# Patient Record
Sex: FEMALE | Race: ASIAN | NOT HISPANIC OR LATINO | Employment: UNEMPLOYED | URBAN - METROPOLITAN AREA
[De-identification: names, ages, dates, MRNs, and addresses within clinical notes are randomized per-mention and may not be internally consistent; named-entity substitution may affect disease eponyms.]

---

## 2021-09-30 ENCOUNTER — HISTORICAL (OUTPATIENT)
Dept: RADIOLOGY | Facility: HOSPITAL | Age: 51
End: 2021-09-30

## 2022-04-09 ENCOUNTER — HISTORICAL (OUTPATIENT)
Dept: ADMINISTRATIVE | Facility: HOSPITAL | Age: 52
End: 2022-04-09

## 2022-04-25 VITALS
WEIGHT: 98 LBS | OXYGEN SATURATION: 100 % | BODY MASS INDEX: 18.5 KG/M2 | SYSTOLIC BLOOD PRESSURE: 119 MMHG | HEIGHT: 61 IN | DIASTOLIC BLOOD PRESSURE: 76 MMHG

## 2023-02-14 LAB
HCV AB SERPL QL IA: NEGATIVE
HIV 1+2 AB+HIV1 P24 AG SERPL QL IA: NORMAL

## 2023-02-17 LAB — PAP RECOMMENDATION EXT: NORMAL

## 2024-01-08 ENCOUNTER — OFFICE VISIT (OUTPATIENT)
Dept: FAMILY MEDICINE | Facility: CLINIC | Age: 54
End: 2024-01-08
Payer: COMMERCIAL

## 2024-01-08 ENCOUNTER — DOCUMENTATION ONLY (OUTPATIENT)
Dept: FAMILY MEDICINE | Facility: CLINIC | Age: 54
End: 2024-01-08

## 2024-01-08 ENCOUNTER — TELEPHONE (OUTPATIENT)
Dept: FAMILY MEDICINE | Facility: CLINIC | Age: 54
End: 2024-01-08

## 2024-01-08 VITALS
RESPIRATION RATE: 17 BRPM | HEART RATE: 67 BPM | SYSTOLIC BLOOD PRESSURE: 96 MMHG | WEIGHT: 113.81 LBS | DIASTOLIC BLOOD PRESSURE: 64 MMHG | HEIGHT: 61 IN | OXYGEN SATURATION: 99 % | BODY MASS INDEX: 21.49 KG/M2 | TEMPERATURE: 98 F

## 2024-01-08 DIAGNOSIS — N94.6 DYSMENORRHEA: ICD-10-CM

## 2024-01-08 DIAGNOSIS — Z12.31 BREAST CANCER SCREENING BY MAMMOGRAM: ICD-10-CM

## 2024-01-08 DIAGNOSIS — Z12.11 ENCOUNTER FOR SCREENING FOR MALIGNANT NEOPLASM OF COLON: ICD-10-CM

## 2024-01-08 DIAGNOSIS — N92.0 MENORRHAGIA WITH REGULAR CYCLE: ICD-10-CM

## 2024-01-08 DIAGNOSIS — Z13.6 ENCOUNTER FOR LIPID SCREENING FOR CARDIOVASCULAR DISEASE: ICD-10-CM

## 2024-01-08 DIAGNOSIS — Z76.89 ENCOUNTER TO ESTABLISH CARE: Primary | ICD-10-CM

## 2024-01-08 DIAGNOSIS — R30.0 DYSURIA: ICD-10-CM

## 2024-01-08 DIAGNOSIS — Z00.00 WELL ADULT HEALTH CHECK: ICD-10-CM

## 2024-01-08 DIAGNOSIS — Z13.220 ENCOUNTER FOR LIPID SCREENING FOR CARDIOVASCULAR DISEASE: ICD-10-CM

## 2024-01-08 DIAGNOSIS — Z13.29 SCREENING FOR THYROID DISORDER: ICD-10-CM

## 2024-01-08 DIAGNOSIS — Z13.1 ENCOUNTER FOR SCREENING FOR DIABETES MELLITUS: ICD-10-CM

## 2024-01-08 LAB
APPEARANCE UR: CLEAR
BACTERIA #/AREA URNS AUTO: NORMAL /HPF
BILIRUB UR QL STRIP.AUTO: NEGATIVE
COLOR UR AUTO: COLORLESS
GLUCOSE UR QL STRIP.AUTO: NORMAL
KETONES UR QL STRIP.AUTO: NEGATIVE
LEUKOCYTE ESTERASE UR QL STRIP.AUTO: NEGATIVE
NITRITE UR QL STRIP.AUTO: NEGATIVE
PH UR STRIP.AUTO: 6.5 [PH]
PROT UR QL STRIP.AUTO: NEGATIVE
RBC #/AREA URNS AUTO: NORMAL /HPF
RBC UR QL AUTO: NEGATIVE
SP GR UR STRIP.AUTO: 1 (ref 1–1.03)
SQUAMOUS #/AREA URNS LPF: NORMAL /HPF
UROBILINOGEN UR STRIP-ACNC: NORMAL
WBC #/AREA URNS AUTO: NORMAL /HPF

## 2024-01-08 PROCEDURE — 3078F DIAST BP <80 MM HG: CPT | Mod: CPTII,,, | Performed by: STUDENT IN AN ORGANIZED HEALTH CARE EDUCATION/TRAINING PROGRAM

## 2024-01-08 PROCEDURE — 1159F MED LIST DOCD IN RCRD: CPT | Mod: CPTII,,, | Performed by: STUDENT IN AN ORGANIZED HEALTH CARE EDUCATION/TRAINING PROGRAM

## 2024-01-08 PROCEDURE — 99204 OFFICE O/P NEW MOD 45 MIN: CPT | Mod: ,,, | Performed by: STUDENT IN AN ORGANIZED HEALTH CARE EDUCATION/TRAINING PROGRAM

## 2024-01-08 PROCEDURE — 3074F SYST BP LT 130 MM HG: CPT | Mod: CPTII,,, | Performed by: STUDENT IN AN ORGANIZED HEALTH CARE EDUCATION/TRAINING PROGRAM

## 2024-01-08 PROCEDURE — 3008F BODY MASS INDEX DOCD: CPT | Mod: CPTII,,, | Performed by: STUDENT IN AN ORGANIZED HEALTH CARE EDUCATION/TRAINING PROGRAM

## 2024-01-08 RX ORDER — NITROFURANTOIN 25; 75 MG/1; MG/1
100 CAPSULE ORAL 2 TIMES DAILY
Qty: 14 CAPSULE | Refills: 0 | Status: SHIPPED | OUTPATIENT
Start: 2024-01-08 | End: 2024-01-15

## 2024-01-08 NOTE — PROGRESS NOTES
Patient ID: [id number]     Chief Complaint: Menorrhagia, Abdominal Cramping, Vaginal Bleeding, and Painful urination    HPI:      Disclaimer:  This note is prepared using voice recognition software and as such is likely to have errors despite attempts at proofreading. Please contact me for questions.     Dante Bro is a 53 y.o. female here today for the following    Acute Issues-  Increased uterine bleeding   Patient reports that she has been having heavy blood follow during the periods.  Denies of any clots.  Recently had a Pap smear which was unremarkable.  Denies any family history of uterine/cervical cancer.  She is currently sexually not active.  Also has been having dysuria.  Reports of having several UTIs in past.  Denies of any suprapubic pain, back pain or fevers      Chronic Issues-    No past medical history on file.     History reviewed. No pertinent surgical history.    Review of patient's allergies indicates:   Allergen Reactions    Ibuprofen Swelling       No current outpatient medications    Social History     Socioeconomic History    Marital status:    Tobacco Use    Smoking status: Never     Passive exposure: Never    Smokeless tobacco: Never   Substance and Sexual Activity    Alcohol use: Never    Drug use: Never    Sexual activity: Not Currently     Partners: Male     Birth control/protection: None     Social Determinants of Health     Financial Resource Strain: Patient Declined (1/8/2024)    Overall Financial Resource Strain (CARDIA)     Difficulty of Paying Living Expenses: Patient declined   Food Insecurity: Patient Declined (1/8/2024)    Hunger Vital Sign     Worried About Running Out of Food in the Last Year: Patient declined     Ran Out of Food in the Last Year: Patient declined   Transportation Needs: Patient Declined (1/8/2024)    PRAPARE - Transportation     Lack of Transportation (Medical): Patient declined     Lack of Transportation (Non-Medical): Patient declined   Physical  Activity: Unknown (1/8/2024)    Exercise Vital Sign     Days of Exercise per Week: 3 days     Minutes of Exercise per Session: Patient declined   Stress: No Stress Concern Present (1/8/2024)    [employer] - Occupational Stress Questionnaire     Feeling of Stress : Not at all   Social Connections: Unknown (1/8/2024)    Social Connection and Isolation Panel [NHANES]     Frequency of Communication with Friends and Family: More than three times a week     Frequency of Social Gatherings with Friends and Family: More than three times a week     Active Member of Clubs or Organizations: Yes     Attends Club or Organization Meetings: Patient declined     Marital Status:    Housing Stability: Unknown (1/8/2024)    Housing Stability Vital Sign     Unable to Pay for Housing in the Last Year: Patient declined     Unstable Housing in the Last Year: No        Family History   Problem Relation Age of Onset    Cancer Father     Asthma Brother         Patient Care Team:  Jeanette Arzola MD as PCP - General (Family Medicine)     Subjective:     ROS    See HPI for details    Constitutional: Denies Change in appetite. Denies Chills. Denies Fever. Denies Night sweats.  Respiratory: Denies Cough. Denies Shortness of breath. Denies Shortness of breath with exertion. Denies Wheezing.  Cardiovascular: DeniesChest pain at rest. Denies Chest pain with exertion. Denies Irregular heartbeat. Denies Palpitations. Denies Edema.  Gastrointestinal: Denies Abdominal pain. DeniesDiarrhea. Denies Nausea. Denies Vomiting. Denies Hematemesis or Hematochezia.  Genitourinary: Denies Dysuria. Denies Urinary frequency. Denies Urinary urgency. Denies Blood in urine.  Endocrine: Denies Cold intolerance. Denies Excessive thirst. Denies Heat intolerance. Denies Weight loss. Denies Weight gain.  Musculoskeletal: Denies Painful joints. Denies Weakness.  Integumentary: Denies Rash. Denies Itching. Denies Dry  skin.  Neurologic: Denies Dizziness. Denies Fainting. Denies Headache.  Psychiatric: Denies Depression. Denies Anxiety. Denies Suicidal/Homicidal ideations.    All Other ROS: Negative except as stated in HPI.  Objective:     Visit Vitals  LMP 12/20/2023 (Exact Date)       Physical Exam    General: Alert and oriented, No acute distress.  Respiratory: Clear to auscultation bilaterally; No wheezes, rales or rhonchi,Non-labored respirations, Symmetrical chest wall expansion.  Cardiovascular: Regular rate and rhythm, S1/S2 normal, No murmurs, rubs or gallops.  Gastrointestinal: Soft, Non-tender, Non-distended, Normal bowel sounds, No palpable organomegaly.  Musculoskeletal: Normal range of motion.  Extremities: No clubbing, cyanosis or edema  Neurologic: No focal deficits  Psychiatric: Normal interaction, Coherent speech, Euthymic mood, Appropriate affect   Assessment:       ICD-10-CM ICD-9-CM   1. Encounter to establish care  Z76.89 V65.8   2. Menorrhagia with regular cycle  N92.0 626.2   3. Dysmenorrhea  N94.6 625.3   4. Dysuria  R30.0 788.1   5. Encounter for screening for diabetes mellitus  Z13.1 V77.1   6. Encounter for lipid screening for cardiovascular disease  Z13.220 V77.91    Z13.6 V81.2   7. Breast cancer screening by mammogram  Z12.31 V76.12   8. Encounter for screening for malignant neoplasm of colon  Z12.11 V76.51   9. Screening for thyroid disorder  Z13.29 V77.0   10. Well adult health check  Z00.00 V70.0        Plan:     1. Encounter to establish care  Discuss about the blood work/screening test/immunizations   Recommend to start dash diet along with 35 minutes of brisk walking    2. Menorrhagia with regular cycle  3. Dysmenorrhea  -     US Transvaginal Non OB; Future; Expected date: 01/08/2024  -     Cancel: POCT Urine Pregnancy- UPT declined  Can take over-the-counter ibuprofen for pain    4. Dysuria  -     Urinalysis  -     Urine culture; Future; Expected date: 01/08/2024  -     nitrofurantoin,  macrocrystal-monohydrate, (MACROBID) 100 MG capsule; Take 1 capsule (100 mg total) by mouth 2 (two) times daily. for 7 days  Dispense: 14 capsule; Refill: 0    5. Encounter for screening for diabetes mellitus  -     Hemoglobin A1C; Future; Expected date: 01/08/2024    6. Encounter for lipid screening for cardiovascular disease  -     Lipid Panel; Future; Expected date: 01/08/2024    7. Breast cancer screening by mammogram  -     Mammo Digital Screening Bilat w/ Abel; Future; Expected date: 01/08/2024    8. Encounter for screening for malignant neoplasm of colon  -     Cologuard Screening (Multitarget Stool DNA); Future; Expected date: 01/08/2024    9. Screening for thyroid disorder  -     TSH; Future; Expected date: 01/08/2024    10. Well adult health check  -     CBC Auto Differential; Future; Expected date: 01/08/2024  -     Comprehensive Metabolic Panel; Future; Expected date: 01/08/2024  -     Vitamin D; Future; Expected date: 01/08/2024                  Follow up in about 4 weeks (around 2/5/2024).   In addition to their scheduled follow up, the patient has also been instructed to follow up on as needed basis.

## 2024-01-08 NOTE — TELEPHONE ENCOUNTER
----- Message from Jeanette Arzola MD sent at 1/8/2024  9:52 AM CST -----  Please get Pap Smear-   From  [facility] on [address]   [phone]

## 2024-01-10 ENCOUNTER — LAB VISIT (OUTPATIENT)
Dept: LAB | Facility: HOSPITAL | Age: 54
End: 2024-01-10
Attending: STUDENT IN AN ORGANIZED HEALTH CARE EDUCATION/TRAINING PROGRAM
Payer: COMMERCIAL

## 2024-01-10 DIAGNOSIS — Z00.00 WELL ADULT HEALTH CHECK: ICD-10-CM

## 2024-01-10 DIAGNOSIS — Z13.29 SCREENING FOR THYROID DISORDER: ICD-10-CM

## 2024-01-10 DIAGNOSIS — Z13.6 ENCOUNTER FOR LIPID SCREENING FOR CARDIOVASCULAR DISEASE: ICD-10-CM

## 2024-01-10 DIAGNOSIS — Z13.220 ENCOUNTER FOR LIPID SCREENING FOR CARDIOVASCULAR DISEASE: ICD-10-CM

## 2024-01-10 DIAGNOSIS — Z13.1 ENCOUNTER FOR SCREENING FOR DIABETES MELLITUS: ICD-10-CM

## 2024-01-10 LAB
ALBUMIN SERPL-MCNC: 3.8 G/DL (ref 3.5–5)
ALBUMIN/GLOB SERPL: 1 RATIO (ref 1.1–2)
ALP SERPL-CCNC: 70 UNIT/L (ref 40–150)
ALT SERPL-CCNC: 17 UNIT/L (ref 0–55)
AST SERPL-CCNC: 14 UNIT/L (ref 5–34)
BASOPHILS # BLD AUTO: 0.12 X10(3)/MCL
BASOPHILS NFR BLD AUTO: 2 %
BILIRUB SERPL-MCNC: 0.6 MG/DL
BUN SERPL-MCNC: 9.8 MG/DL (ref 9.8–20.1)
CALCIUM SERPL-MCNC: 9.6 MG/DL (ref 8.4–10.2)
CHLORIDE SERPL-SCNC: 104 MMOL/L (ref 98–107)
CHOLEST SERPL-MCNC: 233 MG/DL
CHOLEST/HDLC SERPL: 4 {RATIO} (ref 0–5)
CO2 SERPL-SCNC: 27 MMOL/L (ref 22–29)
CREAT SERPL-MCNC: 0.73 MG/DL (ref 0.55–1.02)
DEPRECATED CALCIDIOL+CALCIFEROL SERPL-MC: 34.1 NG/ML (ref 30–80)
EOSINOPHIL # BLD AUTO: 0.28 X10(3)/MCL (ref 0–0.9)
EOSINOPHIL NFR BLD AUTO: 4.7 %
ERYTHROCYTE [DISTWIDTH] IN BLOOD BY AUTOMATED COUNT: 12.9 % (ref 11.5–17)
EST. AVERAGE GLUCOSE BLD GHB EST-MCNC: 116.9 MG/DL
GFR SERPLBLD CREATININE-BSD FMLA CKD-EPI: >60 MLS/MIN/1.73/M2
GLOBULIN SER-MCNC: 3.7 GM/DL (ref 2.4–3.5)
GLUCOSE SERPL-MCNC: 99 MG/DL (ref 74–100)
HBA1C MFR BLD: 5.7 %
HCT VFR BLD AUTO: 41.5 % (ref 37–47)
HDLC SERPL-MCNC: 55 MG/DL (ref 35–60)
HGB BLD-MCNC: 13.2 G/DL (ref 12–16)
IMM GRANULOCYTES # BLD AUTO: 0.02 X10(3)/MCL (ref 0–0.04)
IMM GRANULOCYTES NFR BLD AUTO: 0.3 %
LDLC SERPL CALC-MCNC: 164 MG/DL (ref 50–140)
LYMPHOCYTES # BLD AUTO: 1.4 X10(3)/MCL (ref 0.6–4.6)
LYMPHOCYTES NFR BLD AUTO: 23.3 %
MCH RBC QN AUTO: 28.4 PG (ref 27–31)
MCHC RBC AUTO-ENTMCNC: 31.8 G/DL (ref 33–36)
MCV RBC AUTO: 89.4 FL (ref 80–94)
MONOCYTES # BLD AUTO: 0.5 X10(3)/MCL (ref 0.1–1.3)
MONOCYTES NFR BLD AUTO: 8.3 %
NEUTROPHILS # BLD AUTO: 3.69 X10(3)/MCL (ref 2.1–9.2)
NEUTROPHILS NFR BLD AUTO: 61.4 %
NRBC BLD AUTO-RTO: 0 %
PLATELET # BLD AUTO: 395 X10(3)/MCL (ref 130–400)
PMV BLD AUTO: 8.8 FL (ref 7.4–10.4)
POTASSIUM SERPL-SCNC: 4 MMOL/L (ref 3.5–5.1)
PROT SERPL-MCNC: 7.5 GM/DL (ref 6.4–8.3)
RBC # BLD AUTO: 4.64 X10(6)/MCL (ref 4.2–5.4)
SODIUM SERPL-SCNC: 138 MMOL/L (ref 136–145)
TRIGL SERPL-MCNC: 70 MG/DL (ref 37–140)
TSH SERPL-ACNC: 1.25 UIU/ML (ref 0.35–4.94)
VLDLC SERPL CALC-MCNC: 14 MG/DL
WBC # SPEC AUTO: 6.01 X10(3)/MCL (ref 4.5–11.5)

## 2024-01-10 PROCEDURE — 83036 HEMOGLOBIN GLYCOSYLATED A1C: CPT

## 2024-01-10 PROCEDURE — 84443 ASSAY THYROID STIM HORMONE: CPT

## 2024-01-10 PROCEDURE — 80053 COMPREHEN METABOLIC PANEL: CPT

## 2024-01-10 PROCEDURE — 85025 COMPLETE CBC W/AUTO DIFF WBC: CPT

## 2024-01-10 PROCEDURE — 36415 COLL VENOUS BLD VENIPUNCTURE: CPT

## 2024-01-10 PROCEDURE — 80061 LIPID PANEL: CPT

## 2024-01-10 PROCEDURE — 82306 VITAMIN D 25 HYDROXY: CPT

## 2024-01-11 ENCOUNTER — TELEPHONE (OUTPATIENT)
Dept: FAMILY MEDICINE | Facility: CLINIC | Age: 54
End: 2024-01-11
Payer: COMMERCIAL

## 2024-01-11 ENCOUNTER — PATIENT MESSAGE (OUTPATIENT)
Dept: FAMILY MEDICINE | Facility: CLINIC | Age: 54
End: 2024-01-11
Payer: COMMERCIAL

## 2024-01-11 ENCOUNTER — HOSPITAL ENCOUNTER (OUTPATIENT)
Dept: RADIOLOGY | Facility: HOSPITAL | Age: 54
Discharge: HOME | End: 2024-01-11
Attending: STUDENT IN AN ORGANIZED HEALTH CARE EDUCATION/TRAINING PROGRAM
Payer: COMMERCIAL

## 2024-01-11 DIAGNOSIS — D21.9 LEIOMYOMA: Primary | ICD-10-CM

## 2024-01-11 DIAGNOSIS — N94.6 DYSMENORRHEA: ICD-10-CM

## 2024-01-11 DIAGNOSIS — N92.0 MENORRHAGIA WITH REGULAR CYCLE: ICD-10-CM

## 2024-01-11 PROCEDURE — 76830 TRANSVAGINAL US NON-OB: CPT | Mod: TC

## 2024-01-11 NOTE — TELEPHONE ENCOUNTER
----- Message from Jeanette Arzola MD sent at 1/10/2024  8:12 PM CST -----  Please call lab and let them know that we need urine culture as the patient asymptomatic  ----- Message -----  From: Lab, Background User  Sent: 1/9/2024   6:23 AM CST  To: Jeanette Arzola MD

## 2024-01-11 NOTE — PROGRESS NOTES
Please inform the patient that transvaginal ultrasound demonstrated leiomyomas (benign soft tissue tumor of uterus).  She will need to be seen by a OB/GYN.  Please find out if the patient has Ob/gyn and fax the results to the patient's Ob/gyn.  If not then please find out patient's preferred OBGYN and fax the referral to her office    Thanks,    Dr. Arzola

## 2024-01-11 NOTE — PROGRESS NOTES
Please inform patient of lab results.    Prediabetic and increased cholesterol.  We will discuss in detail during the next clinic visit.  As of now recommend the patient to start 35 minutes of brisk walking along with Mediterranean diet.  Avoid greasy/fatty diet.    Other labwork within acceptable ranges.    Thanks,    Dr. Arzola

## 2024-01-12 NOTE — TELEPHONE ENCOUNTER
----- Message from Jeanette Arzola MD sent at 1/11/2024  4:54 PM CST -----  Please inform the patient that transvaginal ultrasound demonstrated leiomyomas (benign soft tissue tumor of uterus).  She will need to be seen by a OB/GYN.  Please find out if the patient has Ob/gyn and fax the results to the patient's Ob/gyn.  If not then please find out patient's preferred OBGYN and fax the referral to her office    Thanks,    Dr. Arzola

## 2024-01-16 ENCOUNTER — TELEPHONE (OUTPATIENT)
Dept: FAMILY MEDICINE | Facility: CLINIC | Age: 54
End: 2024-01-16
Payer: COMMERCIAL

## 2024-01-16 DIAGNOSIS — D21.9 LEIOMYOMA: Primary | ICD-10-CM

## 2024-01-16 DIAGNOSIS — N30.00 ACUTE CYSTITIS WITHOUT HEMATURIA: Primary | ICD-10-CM

## 2024-01-16 RX ORDER — AMOXICILLIN AND CLAVULANATE POTASSIUM 875; 125 MG/1; MG/1
1 TABLET, FILM COATED ORAL EVERY 12 HOURS
Qty: 14 TABLET | Refills: 0 | Status: SHIPPED | OUTPATIENT
Start: 2024-01-16 | End: 2024-01-23

## 2024-01-16 NOTE — TELEPHONE ENCOUNTER
----- Message from Jeanette Arzola MD sent at 1/16/2024 10:59 AM CST -----  Please inform patient of lab results.    1. E coli less than 10,000 CFU.  Since the patient symptomatic will send antibiotics to her pharmacy.  Discontinue nitrofurantoin.  Recommend to start antibiotics as prescribed increase hydration.  Please give ER precautions.    2.     3. Other labwork within acceptable ranges.    Thanks,    Dr. Arzola

## 2024-02-15 ENCOUNTER — OFFICE VISIT (OUTPATIENT)
Dept: FAMILY MEDICINE | Facility: CLINIC | Age: 54
End: 2024-02-15
Payer: COMMERCIAL

## 2024-02-15 VITALS
OXYGEN SATURATION: 99 % | HEART RATE: 72 BPM | DIASTOLIC BLOOD PRESSURE: 68 MMHG | TEMPERATURE: 98 F | RESPIRATION RATE: 17 BRPM | BODY MASS INDEX: 19.77 KG/M2 | WEIGHT: 104.69 LBS | HEIGHT: 61 IN | SYSTOLIC BLOOD PRESSURE: 106 MMHG

## 2024-02-15 DIAGNOSIS — N39.0 RECURRENT UTI: ICD-10-CM

## 2024-02-15 DIAGNOSIS — Z00.00 WELL ADULT HEALTH CHECK: ICD-10-CM

## 2024-02-15 DIAGNOSIS — R73.03 PREDIABETES: ICD-10-CM

## 2024-02-15 DIAGNOSIS — E78.2 MIXED HYPERLIPIDEMIA: ICD-10-CM

## 2024-02-15 DIAGNOSIS — Z00.00 WELLNESS EXAMINATION: ICD-10-CM

## 2024-02-15 LAB
APPEARANCE UR: CLEAR
BACTERIA #/AREA URNS AUTO: ABNORMAL /HPF
BILIRUB UR QL STRIP.AUTO: NEGATIVE
COLOR UR AUTO: YELLOW
GLUCOSE UR QL STRIP.AUTO: NORMAL
KETONES UR QL STRIP.AUTO: NEGATIVE
LEUKOCYTE ESTERASE UR QL STRIP.AUTO: NEGATIVE
MUCOUS THREADS URNS QL MICRO: ABNORMAL /LPF
NITRITE UR QL STRIP.AUTO: NEGATIVE
PH UR STRIP.AUTO: 5.5 [PH]
PROT UR QL STRIP.AUTO: NEGATIVE
RBC #/AREA URNS AUTO: ABNORMAL /HPF
RBC UR QL AUTO: NEGATIVE
SP GR UR STRIP.AUTO: 1.02 (ref 1–1.03)
SQUAMOUS #/AREA URNS LPF: ABNORMAL /HPF
UROBILINOGEN UR STRIP-ACNC: NORMAL
WBC #/AREA URNS AUTO: ABNORMAL /HPF

## 2024-02-15 PROCEDURE — 3044F HG A1C LEVEL LT 7.0%: CPT | Mod: CPTII,,, | Performed by: STUDENT IN AN ORGANIZED HEALTH CARE EDUCATION/TRAINING PROGRAM

## 2024-02-15 PROCEDURE — 1159F MED LIST DOCD IN RCRD: CPT | Mod: CPTII,,, | Performed by: STUDENT IN AN ORGANIZED HEALTH CARE EDUCATION/TRAINING PROGRAM

## 2024-02-15 PROCEDURE — 3008F BODY MASS INDEX DOCD: CPT | Mod: CPTII,,, | Performed by: STUDENT IN AN ORGANIZED HEALTH CARE EDUCATION/TRAINING PROGRAM

## 2024-02-15 PROCEDURE — 1160F RVW MEDS BY RX/DR IN RCRD: CPT | Mod: CPTII,,, | Performed by: STUDENT IN AN ORGANIZED HEALTH CARE EDUCATION/TRAINING PROGRAM

## 2024-02-15 PROCEDURE — 3078F DIAST BP <80 MM HG: CPT | Mod: CPTII,,, | Performed by: STUDENT IN AN ORGANIZED HEALTH CARE EDUCATION/TRAINING PROGRAM

## 2024-02-15 PROCEDURE — 99396 PREV VISIT EST AGE 40-64: CPT | Mod: ,,, | Performed by: STUDENT IN AN ORGANIZED HEALTH CARE EDUCATION/TRAINING PROGRAM

## 2024-02-15 PROCEDURE — 3074F SYST BP LT 130 MM HG: CPT | Mod: CPTII,,, | Performed by: STUDENT IN AN ORGANIZED HEALTH CARE EDUCATION/TRAINING PROGRAM

## 2024-02-15 RX ORDER — AMOXICILLIN AND CLAVULANATE POTASSIUM 875; 125 MG/1; MG/1
1 TABLET, FILM COATED ORAL EVERY 12 HOURS
Qty: 14 TABLET | Refills: 0 | Status: SHIPPED | OUTPATIENT
Start: 2024-02-15 | End: 2024-02-22

## 2024-02-15 RX ORDER — NITROFURANTOIN 25; 75 MG/1; MG/1
100 CAPSULE ORAL 2 TIMES DAILY
Qty: 14 CAPSULE | Refills: 0 | Status: SHIPPED | OUTPATIENT
Start: 2024-02-15 | End: 2024-02-15

## 2024-02-15 RX ORDER — PHENAZOPYRIDINE HYDROCHLORIDE 200 MG/1
200 TABLET, FILM COATED ORAL 3 TIMES DAILY PRN
Qty: 6 TABLET | Refills: 0 | Status: SHIPPED | OUTPATIENT
Start: 2024-02-15 | End: 2024-02-17

## 2024-02-15 NOTE — PROGRESS NOTES
Patient ID: [id number]     Chief Complaint:  Annual Wellness    HPI:      Disclaimer:  This note is prepared using voice recognition software and as such is likely to have errors despite attempts at proofreading. Please contact me for questions.    Dante Bro is a 53 y.o. female here today for an annual wellness visit.    Patient has following issues to discuss today    Acute Issues-  Recurrent UTI-  Patient has a having recurrent UTI with bladder pain.  Denies of any fever, suprapubic pain, back pain.  It was associated with dribbling of urine.    Review of patient's allergies indicates:   Allergen Reactions    Ibuprofen Swelling       No current outpatient medications    Social History     Socioeconomic History    Marital status:    Tobacco Use    Smoking status: Never     Passive exposure: Never    Smokeless tobacco: Never   Substance and Sexual Activity    Alcohol use: Never    Drug use: Never    Sexual activity: Not Currently     Partners: Male     Birth control/protection: None     Social Determinants of Health     Financial Resource Strain: Patient Declined (1/8/2024)    Overall Financial Resource Strain (CARDIA)     Difficulty of Paying Living Expenses: Patient declined   Food Insecurity: Patient Declined (1/8/2024)    Hunger Vital Sign     Worried About Running Out of Food in the Last Year: Patient declined     Ran Out of Food in the Last Year: Patient declined   Transportation Needs: Patient Declined (1/8/2024)    PRAPARE - Transportation     Lack of Transportation (Medical): Patient declined     Lack of Transportation (Non-Medical): Patient declined   Physical Activity: Unknown (1/8/2024)    Exercise Vital Sign     Days of Exercise per Week: 3 days     Minutes of Exercise per Session: Patient declined   Stress: No Stress Concern Present (1/8/2024)    [employer] [employer] - Occupational Stress Questionnaire     Feeling of Stress : Not at all   Social Connections: Unknown  (1/8/2024)    Social Connection and Isolation Panel [NHANES]     Frequency of Communication with Friends and Family: More than three times a week     Frequency of Social Gatherings with Friends and Family: More than three times a week     Active Member of Clubs or Organizations: Yes     Attends Club or Organization Meetings: Patient declined     Marital Status:    Housing Stability: Unknown (1/8/2024)    Housing Stability Vital Sign     Unable to Pay for Housing in the Last Year: Patient declined     Unstable Housing in the Last Year: No        Family History   Problem Relation Age of Onset    Cancer Father     Asthma Brother         Patient Care Team:  Jeanette Arzola MD as PCP - General (Family Medicine)       Subjective:     ROS    See HPI for details    Constitutional: Denies Change in appetite. Denies Chills. Denies Fever. Denies Night sweats.  Respiratory: Denies Cough. Denies Shortness of breath. Denies Shortness of breath with exertion. Denies Wheezing.  Cardiovascular: DeniesChest pain at rest. Denies Chest pain with exertion. Denies Irregular heartbeat. Denies Palpitations. Denies Edema.  Gastrointestinal: Denies Abdominal pain. DeniesDiarrhea. Denies Nausea. Denies Vomiting. Denies Hematemesis or Hematochezia.  Genitourinary: Denies Dysuria. Denies Urinary frequency. Denies Urinary urgency. Denies Blood in urine.  Endocrine: Denies Cold intolerance. Denies Excessive thirst. Denies Heat intolerance. Denies Weight loss. Denies Weight gain.  Musculoskeletal: Denies Painful joints. Denies Weakness.  Integumentary: Denies Rash. Denies Itching. Denies Dry skin.  Neurologic: Denies Dizziness. Denies Fainting. Denies Headache.  Psychiatric: Denies Depression. Denies Anxiety. Denies Suicidal/Homicidal ideations.    All Other ROS: Negative except as stated in HPI.       Objective:     Visit Vitals  /68 (BP Location: Right arm, Patient Position: Sitting, BP Method: Large (Manual))   Pulse 72   Temp  "98.1 °F (36.7 °C) (Oral)   Resp 17   Ht 5' 1" (1.549 m)   Wt 47.5 kg (104 lb 11.2 oz)   SpO2 99%   BMI 19.78 kg/m²       Physical Exam    General: Alert and oriented, No acute distress.  Neck/Thyroid: Supple, Non-tender  Respiratory: Clear to auscultation bilaterally; No wheezes  Cardiovascular: Regular rate and rhythm  Gastrointestinal: Soft, Non-tender,No palpable organomegaly.  Musculoskeletal: Normal range of motion.  Neurologic: No focal deficits  Psychiatric: Normal interaction, Coherent speech, Euthymic mood, Appropriate affect       Assessment:       ICD-10-CM ICD-9-CM   1. Wellness examination  Z00.00 V70.0   2. Mixed hyperlipidemia  E78.2 272.2   3. Prediabetes  R73.03 790.29   4. Recurrent UTI  N39.0 599.0        Plan:         Health Maintenance Topics with due status: Not Due       Topic Last Completion Date    Cervical Cancer Screening 02/17/2023    Lipid Panel 01/10/2024          Vaccinations -   Immunization History   Administered Date(s) Administered    COVID-19, MRNA, LN-S, PF (Pfizer) (Purple Cap) 09/10/2021, 10/01/2021    Influenza - Quadrivalent - PF *Preferred* (6 months and older) 11/29/2022, 11/16/2023        1. Wellness examination  Cervical Cancer Screening -Pap Smear by [facility]  Breast Cancer Screening - Mammogram ordered  Colon Cancer Screening - Cologuard sent  Osteoporosis Screening -  DEXA   Eye Exam - Recommend annually.  Dental Exam - Recommend biannual exams.     Diet discussed (healthy food choices, reduce portions and overall calorie intake)  Exercise 30-45 minutes 5x per week  Avoid excessive alcohol and tobacco if taking  Immunizations dicussed  Monthly breast exam recommended  Preventative exams discussed.      2. Mixed hyperlipidemia  The 10-year ASCVD risk score (Angela SMILEY, et al., 2019) is: 0.9%    Values used to calculate the score:      Age: 53 years      Sex: Female      Is Non- : No      Diabetic: No      Tobacco smoker: No      Systolic Blood " Pressure: 96 mmHg      Is BP treated: No      HDL Cholesterol: 55 mg/dL      Total Cholesterol: 233 mg/dL   Recommend to start 35 minutes of brisk walking along with Mediterranean diet    3. Prediabetes  -     Hemoglobin A1C; Future; Expected date: 02/15/2024  Okay we will not start metformin therapy as of today   Recommend lifestyle modification if does not get better in 4-6 months then we will start metformin therapy    4. Recurrent UTI  -     CT Abdomen Pelvis  Without Contrast; Future; Expected date: 02/15/2024  -     Urinalysis  -     Urine culture; Future; Expected date: 02/15/2024  -     Discontinue: nitrofurantoin, macrocrystal-monohydrate, (MACROBID) 100 MG capsule; Take 1 capsule (100 mg total) by mouth 2 (two) times daily. for 7 days  Dispense: 14 capsule; Refill: 0  -     phenazopyridine (PYRIDIUM) 200 MG tablet; Take 1 tablet (200 mg total) by mouth 3 (three) times daily as needed for Pain.  Dispense: 6 tablet; Refill: 0  -     POCT Urine Pregnancy  -     amoxicillin-clavulanate 875-125mg (AUGMENTIN) 875-125 mg per tablet; Take 1 tablet by mouth every 12 (twelve) hours. for 7 days  Dispense: 14 tablet; Refill: 0                The patient's Health Maintenance was reviewed and the following appears to be due at this time:   Health Maintenance Due   Topic Date Due    TETANUS VACCINE  Never done    Mammogram  Never done    Colorectal Cancer Screening  Never done    Shingles Vaccine (1 of 2) Never done    COVID-19 Vaccine (3 - 2023-24 season) 09/01/2023         Follow up in about 4 months (around 6/15/2024) for Prediabetes.  One year annual wellness follow-up.   In addition to their scheduled follow up, the patient has also been instructed to follow up on as needed basis.

## 2024-02-27 ENCOUNTER — PATIENT MESSAGE (OUTPATIENT)
Dept: FAMILY MEDICINE | Facility: CLINIC | Age: 54
End: 2024-02-27
Payer: COMMERCIAL

## 2024-02-29 ENCOUNTER — PATIENT MESSAGE (OUTPATIENT)
Dept: FAMILY MEDICINE | Facility: CLINIC | Age: 54
End: 2024-02-29
Payer: COMMERCIAL

## 2024-02-29 LAB — NONINV COLON CA DNA+OCC BLD SCRN STL QL: NEGATIVE

## 2024-03-07 ENCOUNTER — HOSPITAL ENCOUNTER (OUTPATIENT)
Dept: RADIOLOGY | Facility: HOSPITAL | Age: 54
Discharge: HOME | End: 2024-03-07
Attending: STUDENT IN AN ORGANIZED HEALTH CARE EDUCATION/TRAINING PROGRAM
Payer: COMMERCIAL

## 2024-03-07 DIAGNOSIS — N39.0 RECURRENT UTI: ICD-10-CM

## 2024-03-07 PROCEDURE — 74176 CT ABD & PELVIS W/O CONTRAST: CPT | Mod: TC

## 2024-03-11 DIAGNOSIS — R05.3 CHRONIC COUGH: ICD-10-CM

## 2024-03-11 DIAGNOSIS — J98.11 ATELECTASIS: Primary | ICD-10-CM

## 2024-03-11 NOTE — PROGRESS NOTES
Please inform patient of CT results.    1. CT abdomen and pelvis demonstrates patchy infiltrative process versus atelectasis of the lung which is not clear    2. CT lung ordered      Thanks,    Dr. Arzola

## 2024-03-12 ENCOUNTER — TELEPHONE (OUTPATIENT)
Dept: FAMILY MEDICINE | Facility: CLINIC | Age: 54
End: 2024-03-12
Payer: COMMERCIAL

## 2024-03-12 NOTE — TELEPHONE ENCOUNTER
----- Message from Jeanette Arzola MD sent at 3/11/2024  5:54 PM CDT -----  Please inform patient of CT results.    1. CT abdomen and pelvis demonstrates patchy infiltrative process versus atelectasis of the lung which is not clear    2. CT lung ordered      Thanks,    Dr. Arzola

## 2024-04-24 ENCOUNTER — OFFICE VISIT (OUTPATIENT)
Dept: FAMILY MEDICINE | Facility: CLINIC | Age: 54
End: 2024-04-24
Payer: COMMERCIAL

## 2024-04-24 VITALS
DIASTOLIC BLOOD PRESSURE: 71 MMHG | HEIGHT: 61 IN | HEART RATE: 68 BPM | WEIGHT: 105 LBS | BODY MASS INDEX: 19.83 KG/M2 | SYSTOLIC BLOOD PRESSURE: 106 MMHG | TEMPERATURE: 98 F | OXYGEN SATURATION: 98 %

## 2024-04-24 DIAGNOSIS — R30.0 DYSURIA: Primary | ICD-10-CM

## 2024-04-24 DIAGNOSIS — N30.10 INTERSTITIAL CYSTITIS: ICD-10-CM

## 2024-04-24 LAB
APPEARANCE UR: ABNORMAL
BACTERIA #/AREA URNS AUTO: ABNORMAL /HPF
BILIRUB UR QL STRIP.AUTO: NEGATIVE
COLOR UR AUTO: ABNORMAL
GLUCOSE UR QL STRIP.AUTO: NORMAL
KETONES UR QL STRIP.AUTO: NEGATIVE
LEUKOCYTE ESTERASE UR QL STRIP.AUTO: 500
MUCOUS THREADS URNS QL MICRO: ABNORMAL /LPF
NITRITE UR QL STRIP.AUTO: NEGATIVE
PH UR STRIP.AUTO: 7 [PH]
PROT UR QL STRIP.AUTO: NEGATIVE
RBC #/AREA URNS AUTO: ABNORMAL /HPF
RBC UR QL AUTO: ABNORMAL
SP GR UR STRIP.AUTO: 1.01 (ref 1–1.03)
SQUAMOUS #/AREA URNS LPF: ABNORMAL /HPF
UROBILINOGEN UR STRIP-ACNC: NORMAL
WBC #/AREA URNS AUTO: >100 /HPF

## 2024-04-24 PROCEDURE — 87086 URINE CULTURE/COLONY COUNT: CPT | Performed by: STUDENT IN AN ORGANIZED HEALTH CARE EDUCATION/TRAINING PROGRAM

## 2024-04-24 PROCEDURE — 1160F RVW MEDS BY RX/DR IN RCRD: CPT | Mod: CPTII,,, | Performed by: STUDENT IN AN ORGANIZED HEALTH CARE EDUCATION/TRAINING PROGRAM

## 2024-04-24 PROCEDURE — 1159F MED LIST DOCD IN RCRD: CPT | Mod: CPTII,,, | Performed by: STUDENT IN AN ORGANIZED HEALTH CARE EDUCATION/TRAINING PROGRAM

## 2024-04-24 PROCEDURE — 99214 OFFICE O/P EST MOD 30 MIN: CPT | Mod: ,,, | Performed by: STUDENT IN AN ORGANIZED HEALTH CARE EDUCATION/TRAINING PROGRAM

## 2024-04-24 PROCEDURE — 81001 URINALYSIS AUTO W/SCOPE: CPT | Performed by: STUDENT IN AN ORGANIZED HEALTH CARE EDUCATION/TRAINING PROGRAM

## 2024-04-24 PROCEDURE — 3044F HG A1C LEVEL LT 7.0%: CPT | Mod: CPTII,,, | Performed by: STUDENT IN AN ORGANIZED HEALTH CARE EDUCATION/TRAINING PROGRAM

## 2024-04-24 PROCEDURE — 3078F DIAST BP <80 MM HG: CPT | Mod: CPTII,,, | Performed by: STUDENT IN AN ORGANIZED HEALTH CARE EDUCATION/TRAINING PROGRAM

## 2024-04-24 PROCEDURE — 3074F SYST BP LT 130 MM HG: CPT | Mod: CPTII,,, | Performed by: STUDENT IN AN ORGANIZED HEALTH CARE EDUCATION/TRAINING PROGRAM

## 2024-04-24 PROCEDURE — 3008F BODY MASS INDEX DOCD: CPT | Mod: CPTII,,, | Performed by: STUDENT IN AN ORGANIZED HEALTH CARE EDUCATION/TRAINING PROGRAM

## 2024-04-24 NOTE — PROGRESS NOTES
Patient ID: [id number]     Chief Complaint: Pain (Patient reports that she is having pain after urination, frequent pain lasting on and off for 6 months.)    HPI:      Disclaimer:  This note is prepared using voice recognition software and as such is likely to have errors despite attempts at proofreading. Please contact me for questions.     Dante Bro is a 54 y.o. female here today for the following        Acute Issues-  Dysuria   Patient reports that she has been having pain after urination and gets better after urination.  Has had multiple dysuria symptoms in past and negative for UTI.  CT abdomen and pelvis was unremarkable.  Denies of any suprapubic pain, back pain, fever, nausea, vomiting, bloody urine    Chronic Issues-    No past medical history on file.     No past surgical history on file.    Review of patient's allergies indicates:   Allergen Reactions    Ibuprofen Swelling       No current outpatient medications    Social History     Socioeconomic History    Marital status:    Tobacco Use    Smoking status: Never     Passive exposure: Never    Smokeless tobacco: Never   Substance and Sexual Activity    Alcohol use: Never    Drug use: Never    Sexual activity: Not Currently     Partners: Male     Birth control/protection: None     Social Determinants of Health     Financial Resource Strain: Low Risk  (4/24/2024)    Overall Financial Resource Strain (CARDIA)     Difficulty of Paying Living Expenses: Not hard at all   Food Insecurity: No Food Insecurity (4/24/2024)    Hunger Vital Sign     Worried About Running Out of Food in the Last Year: Never true     Ran Out of Food in the Last Year: Never true   Transportation Needs: No Transportation Needs (4/24/2024)    PRAPARE - Transportation     Lack of Transportation (Medical): No     Lack of Transportation (Non-Medical): No   Physical Activity: Sufficiently Active (4/24/2024)    Exercise Vital Sign     Days of Exercise per Week: 7 days     Minutes of  Exercise per Session: 60 min   Stress: No Stress Concern Present (4/24/2024)    [employer] - Occupational Stress Questionnaire     Feeling of Stress : Not at all   Social Connections: Moderately Integrated (4/24/2024)    Social Connection and Isolation Panel [NHANES]     Frequency of Communication with Friends and Family: More than three times a week     Frequency of Social Gatherings with Friends and Family: More than three times a week     Attends [employer] Services: More than 4 times per year     Active Member of Clubs or Organizations: Yes     Attends Club or Organization Meetings: More than 4 times per year     Marital Status:    Housing Stability: Low Risk  (4/24/2024)    Housing Stability Vital Sign     Unable to Pay for Housing in the Last Year: No     Number of Times Moved in the Last Year: 0     Homeless in the Last Year: No        Family History   Problem Relation Name Age of Onset    Cancer Father      Asthma Brother          Patient Care Team:  Jeanette Arzola MD as PCP - General (Family Medicine)     Subjective:     ROS    See HPI for details    Constitutional: Denies Change in appetite. Denies Chills. Denies Fever. Denies Night sweats.  Respiratory: Denies Cough. Denies Shortness of breath. Denies Shortness of breath with exertion. Denies Wheezing.  Cardiovascular: DeniesChest pain at rest. Denies Chest pain with exertion. Denies Irregular heartbeat. Denies Palpitations. Denies Edema.  Gastrointestinal: Denies Abdominal pain. DeniesDiarrhea. Denies Nausea. Denies Vomiting. Denies Hematemesis or Hematochezia.  Genitourinary: Denies Dysuria. Denies Urinary frequency. Denies Urinary urgency. Denies Blood in urine.  Endocrine: Denies Cold intolerance. Denies Excessive thirst. Denies Heat intolerance. Denies Weight loss. Denies Weight gain.  Musculoskeletal: Denies Painful joints. Denies Weakness.  Integumentary: Denies Rash. Denies Itching. Denies Dry  "skin.  Neurologic: Denies Dizziness. Denies Fainting. Denies Headache.  Psychiatric: Denies Depression. Denies Anxiety. Denies Suicidal/Homicidal ideations.    All Other ROS: Negative except as stated in HPI.  Objective:     Visit Vitals  /71 (BP Location: Left arm, Patient Position: Sitting, BP Method: Small (Automatic))   Pulse 68   Temp 98.2 °F (36.8 °C) (Oral)   Ht 5' 1" (1.549 m)   Wt 47.6 kg (105 lb)   SpO2 98%   BMI 19.84 kg/m²       Physical Exam    General: Alert and oriented, No acute distress.  Respiratory: Clear to auscultation bilaterally; No wheezes, rales or rhonchi,Non-labored respirations, Symmetrical chest wall expansion.  Cardiovascular: Regular rate and rhythm, S1/S2 normal, No murmurs, rubs or gallops.    Assessment:       ICD-10-CM ICD-9-CM   1. Dysuria  R30.0 788.1   2. Interstitial cystitis  N30.10 595.1        Plan:     1. Dysuria  We will hold off on nitrofurantoin as of now since multiple times patient has been negative for UTI  -     Ambulatory referral/consult to Urology; Future; Expected date: 05/01/2024  -     Urine culture; Future; Expected date: 04/24/2024  -     Urinalysis    2. Interstitial cystitis  Recommend to start amitriptyline but patient would like to hold off until being seen by Urology  Urology referral sent for cystoscopy  -     Ambulatory referral/consult to Urology; Future; Expected date: 05/01/2024                  Follow up in about 1 year (around 4/24/2025) for Annual Wellness.   In addition to their scheduled follow up, the patient has also been instructed to follow up on as needed basis.       "

## 2024-04-25 ENCOUNTER — PATIENT MESSAGE (OUTPATIENT)
Dept: FAMILY MEDICINE | Facility: CLINIC | Age: 54
End: 2024-04-25
Payer: COMMERCIAL

## 2024-04-25 DIAGNOSIS — G47.00 INSOMNIA, UNSPECIFIED TYPE: Primary | ICD-10-CM

## 2024-04-25 RX ORDER — DOXEPIN 6 MG/1
6 TABLET, FILM COATED ORAL NIGHTLY
Qty: 30 TABLET | Refills: 0 | Status: SHIPPED | OUTPATIENT
Start: 2024-04-25

## 2024-04-25 NOTE — PROGRESS NOTES
Please inform patient of lab results.    1. No growth as of now for 24 hours      Thanks,    Dr. Arzola

## 2024-04-26 ENCOUNTER — TELEPHONE (OUTPATIENT)
Dept: FAMILY MEDICINE | Facility: CLINIC | Age: 54
End: 2024-04-26
Payer: COMMERCIAL

## 2024-04-26 NOTE — PROGRESS NOTES
Please inform patient of lab results.    1. Gram-negative rods less than 50,000 colony-forming unit but continue antibiotics as the patient is symptomatic.  Waiting on sensitivity.  Increase hydration        Thanks,    Dr. Arzola

## 2024-04-26 NOTE — TELEPHONE ENCOUNTER
----- Message from Viky Nielson sent at 4/26/2024 11:16 AM CDT -----  Regarding: urology referral  .Type:  Needs Medical Advice    Who Called: pt    Symptoms (please be specific):      How long has patient had these symptoms:      Pharmacy name and phone #:      Would the patient rather a call back or a response via [facility]?     Best Call Back Number: [phone]    Additional Information: pt called to state that she was contacted by Dr. Alexander Olson's office for an appt, but it will not be until July; pt states that she cannot wait that long with her symptoms; she'd like it if a sooner appt could be found for her; please advise. Thanks.

## 2024-04-26 NOTE — TELEPHONE ENCOUNTER
Called pt and voiced I found an sooner appointment with [facility]. I have faxed over all the information.

## 2024-04-27 LAB — BACTERIA UR CULT: ABNORMAL

## 2024-04-29 ENCOUNTER — TELEPHONE (OUTPATIENT)
Dept: FAMILY MEDICINE | Facility: CLINIC | Age: 54
End: 2024-04-29
Payer: COMMERCIAL

## 2024-04-29 DIAGNOSIS — G47.00 INSOMNIA, UNSPECIFIED TYPE: ICD-10-CM

## 2024-04-29 DIAGNOSIS — N30.10 INTERSTITIAL CYSTITIS: Primary | ICD-10-CM

## 2024-04-29 DIAGNOSIS — N30.00 ACUTE CYSTITIS WITHOUT HEMATURIA: Primary | ICD-10-CM

## 2024-04-29 RX ORDER — ZOLPIDEM TARTRATE 5 MG/1
5 TABLET ORAL NIGHTLY PRN
Qty: 30 TABLET | Refills: 0 | Status: SHIPPED | OUTPATIENT
Start: 2024-04-29 | End: 2024-06-16

## 2024-04-29 RX ORDER — AMOXICILLIN AND CLAVULANATE POTASSIUM 875; 125 MG/1; MG/1
1 TABLET, FILM COATED ORAL EVERY 12 HOURS
Qty: 14 TABLET | Refills: 0 | Status: SHIPPED | OUTPATIENT
Start: 2024-04-29 | End: 2024-05-06

## 2024-04-29 NOTE — TELEPHONE ENCOUNTER
----- Message from Jaquelin Ken sent at 4/29/2024 11:19 AM CDT -----  .Type:  Patient Returning Call    Who Called:Yusef from [facility] pharmacy   Who Left Message for Patient:Yusef  Does the patient know what this is regarding?:doxepin (SILENOR) 6 mg Tab  Would the patient rather a call back or a response via [facility]?   Best Call Back Number:[phone]  Additional Information: Please call back about doxepin (SILENOR) 6 mg Tab

## 2024-04-30 NOTE — PROGRESS NOTES
Please inform patient of lab results.    1. Urine culture demonstrates Klebsiella pneumoniae, sensitive to Augmentin.  Please continue and finish the antibiotics as prescribed    Thanks,    Dr. Arzola

## 2024-06-06 ENCOUNTER — HOSPITAL ENCOUNTER (OUTPATIENT)
Dept: RADIOLOGY | Facility: HOSPITAL | Age: 54
Discharge: HOME | End: 2024-06-06
Attending: STUDENT IN AN ORGANIZED HEALTH CARE EDUCATION/TRAINING PROGRAM
Payer: COMMERCIAL

## 2024-06-06 DIAGNOSIS — Z12.31 BREAST CANCER SCREENING BY MAMMOGRAM: ICD-10-CM

## 2024-06-06 PROCEDURE — 77063 BREAST TOMOSYNTHESIS BI: CPT | Mod: 26,,, | Performed by: STUDENT IN AN ORGANIZED HEALTH CARE EDUCATION/TRAINING PROGRAM

## 2024-06-06 PROCEDURE — 77067 SCR MAMMO BI INCL CAD: CPT | Mod: 26,,, | Performed by: STUDENT IN AN ORGANIZED HEALTH CARE EDUCATION/TRAINING PROGRAM

## 2024-06-06 PROCEDURE — 77063 BREAST TOMOSYNTHESIS BI: CPT | Mod: TC

## 2024-06-11 DIAGNOSIS — R92.8 ABNORMAL MAMMOGRAM: Primary | ICD-10-CM

## 2024-06-11 NOTE — PROGRESS NOTES
Please inform patient of abnormal mammogram    RECOMMENDATIONS:   Bilateral diagnostic mammography with tomosynthesis (to include both spot compression and spot magnification views of the right breast and spot magnification views of the left breast), followed by targeted bilateral breast ultrasound if needed.    [facility] staff will call the patient to schedule.         Thanks,    Dr. Arzola

## 2024-06-12 ENCOUNTER — PATIENT MESSAGE (OUTPATIENT)
Dept: FAMILY MEDICINE | Facility: CLINIC | Age: 54
End: 2024-06-12
Payer: COMMERCIAL

## 2024-06-16 ENCOUNTER — TELEPHONE (OUTPATIENT)
Dept: URGENT CARE | Facility: CLINIC | Age: 54
End: 2024-06-16

## 2024-06-16 ENCOUNTER — OFFICE VISIT (OUTPATIENT)
Dept: URGENT CARE | Facility: CLINIC | Age: 54
End: 2024-06-16
Payer: COMMERCIAL

## 2024-06-16 VITALS
TEMPERATURE: 98 F | SYSTOLIC BLOOD PRESSURE: 112 MMHG | BODY MASS INDEX: 20.39 KG/M2 | RESPIRATION RATE: 18 BRPM | HEIGHT: 61 IN | OXYGEN SATURATION: 100 % | HEART RATE: 63 BPM | WEIGHT: 108 LBS | DIASTOLIC BLOOD PRESSURE: 74 MMHG

## 2024-06-16 DIAGNOSIS — R35.0 URINARY FREQUENCY: ICD-10-CM

## 2024-06-16 DIAGNOSIS — N39.0 URINARY TRACT INFECTION WITHOUT HEMATURIA, SITE UNSPECIFIED: Primary | ICD-10-CM

## 2024-06-16 LAB
BILIRUB UR QL STRIP: NEGATIVE
GLUCOSE UR QL STRIP: NEGATIVE
KETONES UR QL STRIP: NEGATIVE
LEUKOCYTE ESTERASE UR QL STRIP: POSITIVE
PH, POC UA: 6.5
POC BLOOD, URINE: NEGATIVE
POC NITRATES, URINE: NEGATIVE
PROT UR QL STRIP: NEGATIVE
SP GR UR STRIP: 1 (ref 1–1.03)
UROBILINOGEN UR STRIP-ACNC: NEGATIVE (ref 0.1–1.1)

## 2024-06-16 PROCEDURE — 81003 URINALYSIS AUTO W/O SCOPE: CPT | Mod: QW,,,

## 2024-06-16 PROCEDURE — 99213 OFFICE O/P EST LOW 20 MIN: CPT | Mod: ,,,

## 2024-06-16 PROCEDURE — 87086 URINE CULTURE/COLONY COUNT: CPT

## 2024-06-16 RX ORDER — AMOXICILLIN AND CLAVULANATE POTASSIUM 875; 125 MG/1; MG/1
1 TABLET, FILM COATED ORAL EVERY 12 HOURS
Qty: 14 TABLET | Refills: 0 | Status: SHIPPED | OUTPATIENT
Start: 2024-06-16 | End: 2024-06-23

## 2024-06-16 RX ORDER — NITROFURANTOIN 25; 75 MG/1; MG/1
100 CAPSULE ORAL 2 TIMES DAILY
Qty: 14 CAPSULE | Refills: 0 | Status: SHIPPED | OUTPATIENT
Start: 2024-06-16 | End: 2024-06-16

## 2024-06-16 NOTE — PATIENT INSTRUCTIONS
UTI: Drink plenty of fluids.  Take antibiotics until completely gone. Take with food to avoid upset stomach.     Pyridium as needed for dysuria/frequency.  This will turn your urine orange, this is nothing to be worried about and a normal side effect of medicine.    We will call you in 3-5 days with your urine culture results.    Go to ER for any high fever, vomiting, severe abdominal pain, or other concerns.

## 2024-06-16 NOTE — PROGRESS NOTES
"Subjective:      Patient ID: Dante Bro is a 54 y.o. female.    Vitals:  height is 5' 1" (1.549 m) and weight is 49 kg (108 lb). Her oral temperature is 97.8 °F (36.6 °C). Her blood pressure is 112/74 and her pulse is 63. Her respiration is 18 and oxygen saturation is 100%.     Chief Complaint: Urinary Frequency     Patient is a 54 y.o. female who presents to urgent care with complaints of urinary frequency, blood in urine, dysuria and pressure in pelvic area x 2 days. Has taken tylenol OTC and cystex, has not had any today.  She denies any known fever, vomiting, severe abdominal pain at this time.     Reviewed patient's urine culture from 2 months ago and we will treat based on sensitivity results.  It appears she has frequent urinary problems.       Genitourinary:  Positive for dysuria, frequency, urgency and hematuria.      Objective:     Physical Exam   Constitutional: She is oriented to person, place, and time. She appears well-developed. She is cooperative.  Non-toxic appearance. She does not appear ill. No distress.   HENT:   Mouth/Throat: Uvula is midline.   Eyes: Lids are normal.   Neck: Trachea normal and phonation normal. No edema present. No erythema present.   Cardiovascular: Normal rate, regular rhythm, normal heart sounds and normal pulses.   Pulmonary/Chest: Effort normal and breath sounds normal. She has no decreased breath sounds.   Abdominal: Normal appearance and bowel sounds are normal. She exhibits no distension. Soft. There is no abdominal tenderness. There is no left CVA tenderness and no right CVA tenderness.   Musculoskeletal: Normal range of motion.         General: No deformity. Normal range of motion.   Neurological: She is alert and oriented to person, place, and time. She exhibits normal muscle tone. Coordination normal.   Skin: Skin is warm, intact, not diaphoretic and no rash.   Psychiatric: Her speech is normal and behavior is normal. Judgment and thought content normal.   Nursing " "note and vitals reviewed.      Assessment:     1. Urinary tract infection without hematuria, site unspecified    2. Urinary frequency           Previous History      Review of patient's allergies indicates:   Allergen Reactions    Ibuprofen Swelling       Past Medical History:   Diagnosis Date    Cystitis      Current Outpatient Medications   Medication Instructions    amoxicillin-clavulanate 875-125mg (AUGMENTIN) 875-125 mg per tablet 1 tablet, Oral, Every 12 hours    doxepin (SILENOR) 6 mg, Oral, Nightly    zolpidem (AMBIEN) 5 mg, Oral, Nightly PRN     Past Surgical History:   Procedure Laterality Date    no past surgery       Family History   Problem Relation Name Age of Onset    Cancer Father      Asthma Brother         Social History     Tobacco Use    Smoking status: Never     Passive exposure: Never    Smokeless tobacco: Never   Substance Use Topics    Alcohol use: Never    Drug use: Never        Physical Exam      Vital Signs Reviewed   /74   Pulse 63   Temp 97.8 °F (36.6 °C) (Oral)   Resp 18   Ht 5' 1" (1.549 m)   Wt 49 kg (108 lb)   SpO2 100%   BMI 20.41 kg/m²        Procedures    Procedures     Labs     Results for orders placed or performed in visit on 06/16/24   POCT Urinalysis, Dipstick, Automated, W/O Scope   Result Value Ref Range    POC Blood, Urine Negative Negative    POC Bilirubin, Urine Negative Negative    POC Urobilinogen, Urine Negative 0.1 - 1.1    POC Ketones, Urine Negative Negative    POC Protein, Urine Negative Negative    POC Nitrates, Urine Negative Negative    POC Glucose, Urine Negative Negative    pH, UA 6.5     POC Specific Gravity, Urine 1.000 (A) 1.003 - 1.029    POC Leukocytes, Urine Positive (A) Negative      Plan:     Patient called and stated that Macrobid it was not helped her in the past and she usually has to take Augmentin.  Sent Augmentin and canceled Macrobid order.     Urinary tract infection without hematuria, site unspecified  -     Discontinue: " nitrofurantoin, macrocrystal-monohydrate, (MACROBID) 100 MG capsule; Take 1 capsule (100 mg total) by mouth 2 (two) times daily. for 7 days  Dispense: 14 capsule; Refill: 0  -     amoxicillin-clavulanate 875-125mg (AUGMENTIN) 875-125 mg per tablet; Take 1 tablet by mouth every 12 (twelve) hours. for 7 days  Dispense: 14 tablet; Refill: 0    Urinary frequency  -     POCT Urinalysis, Dipstick, Automated, W/O Scope  -     Urine culture      UTI: Drink plenty of fluids.  Take antibiotics until completely gone. Take with food to avoid upset stomach.     Pyridium as needed for dysuria/frequency.  This will turn your urine orange, this is nothing to be worried about and a normal side effect of medicine.    We will call you in 3-5 days with your urine culture results.    Go to ER for any high fever, vomiting, severe abdominal pain, or other concerns.

## 2024-06-16 NOTE — TELEPHONE ENCOUNTER
Patient would like to know if she should take Rx of Macrobid or if an alternative medication could be sent to pharmacy. She stated that she did not realize that Macrobid was generic for nitrofurantoin when discussing in the room,and that this medication has not helped her in th past. Please advise.

## 2024-06-18 LAB — BACTERIA UR CULT: ABNORMAL

## 2024-06-20 RX ORDER — SULFAMETHOXAZOLE AND TRIMETHOPRIM 800; 160 MG/1; MG/1
1 TABLET ORAL 2 TIMES DAILY
Qty: 14 TABLET | Refills: 0 | Status: SHIPPED | OUTPATIENT
Start: 2024-06-20 | End: 2024-06-27

## 2024-08-19 ENCOUNTER — HOSPITAL ENCOUNTER (OUTPATIENT)
Dept: RADIOLOGY | Facility: HOSPITAL | Age: 54
Discharge: HOME | End: 2024-08-19
Attending: STUDENT IN AN ORGANIZED HEALTH CARE EDUCATION/TRAINING PROGRAM
Payer: COMMERCIAL

## 2024-08-19 VITALS — BODY MASS INDEX: 20.39 KG/M2 | HEIGHT: 61 IN | WEIGHT: 108 LBS

## 2024-08-19 DIAGNOSIS — R92.8 ABNORMAL MAMMOGRAM: ICD-10-CM

## 2024-08-19 PROCEDURE — 76642 ULTRASOUND BREAST LIMITED: CPT | Mod: 26,50,, | Performed by: STUDENT IN AN ORGANIZED HEALTH CARE EDUCATION/TRAINING PROGRAM

## 2024-08-19 PROCEDURE — 76642 ULTRASOUND BREAST LIMITED: CPT | Mod: TC,50

## 2024-08-19 PROCEDURE — 77062 BREAST TOMOSYNTHESIS BI: CPT | Mod: TC

## 2024-08-19 PROCEDURE — 77066 DX MAMMO INCL CAD BI: CPT | Mod: 26,,, | Performed by: STUDENT IN AN ORGANIZED HEALTH CARE EDUCATION/TRAINING PROGRAM

## 2024-08-19 PROCEDURE — 77062 BREAST TOMOSYNTHESIS BI: CPT | Mod: 26,,, | Performed by: STUDENT IN AN ORGANIZED HEALTH CARE EDUCATION/TRAINING PROGRAM

## 2024-08-20 NOTE — CARE UPDATE
Breast biopsy recommended by radiologist and discussed with patient.  Patient declined scheduling biopsy appointment while in office. She states that she would like to discuss with her primary care provider and will contact the breast center to scheduled appointment afterward. Breast center contact information was given to the patient.

## 2024-08-20 NOTE — PROGRESS NOTES
Abnormal mammogram.  Recommend stereotactic/tomosynthesis guided core needle biopsy of the right breast 1:00 axis posterior depth grouped coarse heterogeneous calcifications.      Recommend single site stereotactic guided core needle biopsy of the left breast 11:00-1:00 axes posterior to anterior depths segmental coarse heterogeneous calcifications.    Recommend ultrasound-guided core needle biopsy of the right breast 12:30 axis 1 cm FN intraductal mass.      Recommend ultrasound-guided core needle biopsy of the left breast 9:00 axis subareolar mass.    Please make sure to get the biopsy scheduled with breast Center    Thanks,    Dr. Arzola

## 2024-08-20 NOTE — PROGRESS NOTES
Abnormal mammogram. Needs further investigation with Biopsy.Please ensure this has been set up with the breast clinic.  Strongly encouraged to keep the appointments for breast biopsy    Thanks,    Dr. Arzola

## 2024-08-21 ENCOUNTER — PATIENT MESSAGE (OUTPATIENT)
Dept: FAMILY MEDICINE | Facility: CLINIC | Age: 54
End: 2024-08-21
Payer: COMMERCIAL

## 2024-09-06 ENCOUNTER — OFFICE VISIT (OUTPATIENT)
Dept: URGENT CARE | Facility: CLINIC | Age: 54
End: 2024-09-06
Payer: COMMERCIAL

## 2024-09-06 VITALS
RESPIRATION RATE: 17 BRPM | DIASTOLIC BLOOD PRESSURE: 76 MMHG | HEIGHT: 61 IN | BODY MASS INDEX: 20.39 KG/M2 | TEMPERATURE: 98 F | WEIGHT: 108 LBS | OXYGEN SATURATION: 100 % | HEART RATE: 64 BPM | SYSTOLIC BLOOD PRESSURE: 133 MMHG

## 2024-09-06 DIAGNOSIS — N30.00 ACUTE CYSTITIS WITHOUT HEMATURIA: ICD-10-CM

## 2024-09-06 DIAGNOSIS — R35.0 URINARY FREQUENCY: Primary | ICD-10-CM

## 2024-09-06 LAB
BILIRUB UR QL STRIP: NEGATIVE
GLUCOSE UR QL STRIP: NEGATIVE
KETONES UR QL STRIP: NEGATIVE
LEUKOCYTE ESTERASE UR QL STRIP: NEGATIVE
PH, POC UA: 6
POC BLOOD, URINE: NEGATIVE
POC NITRATES, URINE: NEGATIVE
PROT UR QL STRIP: NEGATIVE
SP GR UR STRIP: 1.01 (ref 1–1.03)
UROBILINOGEN UR STRIP-ACNC: NEGATIVE (ref 0.1–1.1)

## 2024-09-06 PROCEDURE — 87086 URINE CULTURE/COLONY COUNT: CPT

## 2024-09-06 RX ORDER — PHENAZOPYRIDINE HYDROCHLORIDE 100 MG/1
100 TABLET, FILM COATED ORAL 3 TIMES DAILY PRN
Qty: 15 TABLET | Refills: 0 | Status: SHIPPED | OUTPATIENT
Start: 2024-09-06 | End: 2024-09-11

## 2024-09-06 RX ORDER — SULFAMETHOXAZOLE AND TRIMETHOPRIM 800; 160 MG/1; MG/1
1 TABLET ORAL 2 TIMES DAILY
Qty: 14 TABLET | Refills: 0 | Status: SHIPPED | OUTPATIENT
Start: 2024-09-06 | End: 2024-09-13

## 2024-09-06 NOTE — PROGRESS NOTES
"Subjective:      Patient ID: Dante Bro is a 54 y.o. female.    Vitals:  height is 5' 1" (1.549 m) and weight is 49 kg (108 lb). Her temperature is 97.6 °F (36.4 °C). Her blood pressure is 133/76 and her pulse is 64. Her respiration is 17 and oxygen saturation is 100%.     Chief Complaint: Dysuria     Patient is a 54 y.o. female who presents to urgent care with complaints of possible UTI. Reporting symptoms of dysuria, pelvic pressure, urinary frequency, cloudy urine (recent menstruation) x 3 days. No alleviating factors. Patient denies fever, flank pain, N/V/D, diaphoresis, urinary retention. She also reports a history of UTI's every 2-3 months over the last 9 months, she had a urology referral but did not make an appointment because she thought her symptoms had resolved, okay to resend the referral today.         Constitution: Negative for chills, fatigue and fever.   HENT: Negative.     Neck: neck negative.   Cardiovascular: Negative.    Eyes: Negative.    Respiratory: Negative.     Gastrointestinal: Negative.    Genitourinary:  Positive for dysuria, frequency, urgency and pelvic pain. Negative for urine decreased, flank pain, hematuria, vaginal discharge and genital sore.      Objective:     Physical Exam   Constitutional: She is oriented to person, place, and time. She appears well-developed. She is cooperative.  Non-toxic appearance. She does not appear ill. No distress.   HENT:   Head: Normocephalic and atraumatic.   Ears:   Right Ear: Hearing and external ear normal.   Left Ear: Hearing and external ear normal.   Mouth/Throat: Oropharynx is clear and moist and mucous membranes are normal.   Eyes: Conjunctivae and lids are normal.   Neck: Trachea normal and phonation normal. Neck supple. No edema present. No erythema present. No neck rigidity present.   Cardiovascular: Normal rate.   Pulmonary/Chest: Effort normal and breath sounds normal. No stridor. No respiratory distress. She has no decreased breath " "sounds. She has no wheezes. She has no rhonchi. She has no rales.   Abdominal: Normal appearance. She exhibits no distension. Soft. flat abdomen There is no abdominal tenderness. There is no rebound, no guarding, no left CVA tenderness and no right CVA tenderness.   Neurological: She is alert and oriented to person, place, and time. She exhibits normal muscle tone. Coordination normal.   Skin: Skin is warm, dry, intact, not diaphoretic and no rash. Capillary refill takes less than 2 seconds.   Psychiatric: Her speech is normal and behavior is normal. Mood normal.   Nursing note and vitals reviewed.       Previous History      Review of patient's allergies indicates:   Allergen Reactions    Ibuprofen Swelling       Past Medical History:   Diagnosis Date    Cystitis      Current Outpatient Medications   Medication Instructions    doxepin (SILENOR) 6 mg, Oral, Nightly    phenazopyridine (PYRIDIUM) 100 mg, Oral, 3 times daily PRN    sulfamethoxazole-trimethoprim 800-160mg (BACTRIM DS) 800-160 mg Tab 1 tablet, Oral, 2 times daily    zolpidem (AMBIEN) 5 mg, Oral, Nightly PRN     Past Surgical History:   Procedure Laterality Date    no past surgery       Family History   Problem Relation Name Age of Onset    Liver cancer Father      Asthma Brother         Social History     Tobacco Use    Smoking status: Never     Passive exposure: Never    Smokeless tobacco: Never   Substance Use Topics    Alcohol use: Never    Drug use: Never        Physical Exam      Vital Signs Reviewed   /76   Pulse 64   Temp 97.6 °F (36.4 °C)   Resp 17   Ht 5' 1" (1.549 m)   Wt 49 kg (108 lb)   LMP 09/03/2024   SpO2 100%   BMI 20.41 kg/m²        Procedures    Procedures     Labs     Results for orders placed or performed in visit on 09/06/24   POCT Urinalysis, Dipstick, Automated, W/O Scope   Result Value Ref Range    POC Blood, Urine Negative Negative    POC Bilirubin, Urine Negative Negative    POC Urobilinogen, Urine Negative 0.1 - " 1.1    POC Ketones, Urine Negative Negative    POC Protein, Urine Negative Negative    POC Nitrates, Urine Negative Negative    POC Glucose, Urine Negative Negative    pH, UA 6     POC Specific Gravity, Urine 1.015 1.003 - 1.029    POC Leukocytes, Urine Negative Negative         Assessment:     1. Urinary frequency    2. Acute cystitis without hematuria        Plan:       Urinary frequency  -     POCT Urinalysis, Dipstick, Automated, W/O Scope    Acute cystitis without hematuria  -     Urine culture    Other orders  -     sulfamethoxazole-trimethoprim 800-160mg (BACTRIM DS) 800-160 mg Tab; Take 1 tablet by mouth 2 (two) times daily. for 7 days  Dispense: 14 tablet; Refill: 0  -     phenazopyridine (PYRIDIUM) 100 MG tablet; Take 1 tablet (100 mg total) by mouth 3 (three) times daily as needed for Pain.  Dispense: 15 tablet; Refill: 0    Urine dip negative today, will start antibiotics and culture the urine today due to patients history of mutliple UTIs with similar symptoms over the last year. We will stop the antibiotics if the culture is negative     Medication sent to pharmacy. Start the antibiotics today, take with food, take to completion.  We will culture urine and call you with the results when they become available.  Do not take both azo and pyridium, take one or the other. Caution as it may turn your urine orange. Monitor for fever. Always wipe from front to back. Avoid scented soaps or bubble baths. Try dove unscented soap as discussed.  Increase your Hydration. Avoid sugary drinks, or caffeine as they can irritate your bladder.    Urology referral sent today, someone will call in the next 1-2 weeks.   If your symptoms persist or worsen or you develop fever, back pain, vomiting, or belly pain return to clinic or seek medical attention immediately

## 2024-09-06 NOTE — PATIENT INSTRUCTIONS
Medication sent to pharmacy. Start the antibiotics today, take with food, take to completion.  We will culture urine and call you with the results when they become available.  Do not take both azo and pyridium, take one or the other. Caution as it may turn your urine orange. Monitor for fever. Always wipe from front to back. Avoid scented soaps or bubble baths. Try dove unscented soap as discussed.  Increase your Hydration. Avoid sugary drinks, or caffeine as they can irritate your bladder. Urology referral sent today, someone will call in the next 1-2 weeks.    If your symptoms persist or worsen or you develop fever, back pain, vomiting, or belly pain return to clinic or seek medical attention immediately

## 2024-09-07 LAB — BACTERIA UR CULT: NORMAL

## 2024-09-24 ENCOUNTER — TELEPHONE (OUTPATIENT)
Dept: FAMILY MEDICINE | Facility: CLINIC | Age: 54
End: 2024-09-24
Payer: COMMERCIAL

## 2024-09-24 NOTE — TELEPHONE ENCOUNTER
----- Message from Sonali Knutson sent at 9/24/2024  9:17 AM CDT -----  Regarding: referral update  [facility] is calling to give update regarding pt's referral. States they've tried to contact pt several times and hasn't been successful. Indicates they will leave pt's referral on file but won't attempt to reach out again, pt can call them to schedule when ready. For further questions pls return call to [phone] option 3

## 2024-09-26 ENCOUNTER — HOSPITAL ENCOUNTER (OUTPATIENT)
Dept: RADIOLOGY | Facility: HOSPITAL | Age: 54
Discharge: HOME | End: 2024-09-26
Attending: STUDENT IN AN ORGANIZED HEALTH CARE EDUCATION/TRAINING PROGRAM
Payer: COMMERCIAL

## 2024-09-26 VITALS — HEIGHT: 61 IN | BODY MASS INDEX: 20.39 KG/M2 | WEIGHT: 108 LBS

## 2024-09-26 DIAGNOSIS — R92.8 ABNORMAL FINDINGS ON DIAGNOSTIC IMAGING OF BREAST: ICD-10-CM

## 2024-09-26 DIAGNOSIS — R92.8 ABNORMAL MAMMOGRAM: ICD-10-CM

## 2024-09-26 PROCEDURE — 77066 DX MAMMO INCL CAD BI: CPT | Mod: TC

## 2024-09-26 PROCEDURE — A4648 IMPLANTABLE TISSUE MARKER: HCPCS

## 2024-09-26 PROCEDURE — 19083 BX BREAST 1ST LESION US IMAG: CPT | Mod: RT

## 2024-09-26 PROCEDURE — 19084 BX BREAST ADD LESION US IMAG: CPT

## 2024-09-26 PROCEDURE — 27201044 MAMMO BREAST STEREOTACTIC BIOPSY 1ST SITE COMPLETE

## 2024-09-26 PROCEDURE — 19082 BX BREAST ADD LESION STRTCTC: CPT

## 2024-09-26 PROCEDURE — 19081 BX BREAST 1ST LESION STRTCTC: CPT

## 2024-09-26 PROCEDURE — 77062 BREAST TOMOSYNTHESIS BI: CPT | Mod: TC

## 2024-10-02 DIAGNOSIS — D24.1 BILATERAL PAPILLOMA OF BREAST: Primary | ICD-10-CM

## 2024-10-02 DIAGNOSIS — D24.2 BILATERAL PAPILLOMA OF BREAST: Primary | ICD-10-CM

## 2024-10-07 ENCOUNTER — OFFICE VISIT (OUTPATIENT)
Dept: FAMILY MEDICINE | Facility: CLINIC | Age: 54
End: 2024-10-07
Payer: COMMERCIAL

## 2024-10-07 VITALS
OXYGEN SATURATION: 98 % | HEART RATE: 58 BPM | RESPIRATION RATE: 18 BRPM | BODY MASS INDEX: 20.22 KG/M2 | DIASTOLIC BLOOD PRESSURE: 66 MMHG | WEIGHT: 103 LBS | HEIGHT: 60 IN | SYSTOLIC BLOOD PRESSURE: 112 MMHG | TEMPERATURE: 99 F

## 2024-10-07 DIAGNOSIS — D24.1 BILATERAL PAPILLOMA OF BREAST: ICD-10-CM

## 2024-10-07 DIAGNOSIS — Z00.00 WELL ADULT HEALTH CHECK: ICD-10-CM

## 2024-10-07 DIAGNOSIS — R92.8 ABNORMAL MAMMOGRAM: ICD-10-CM

## 2024-10-07 DIAGNOSIS — D24.2 BILATERAL PAPILLOMA OF BREAST: ICD-10-CM

## 2024-10-07 DIAGNOSIS — F41.1 GAD (GENERALIZED ANXIETY DISORDER): ICD-10-CM

## 2024-10-07 DIAGNOSIS — R00.1 BRADYCARDIA: ICD-10-CM

## 2024-10-07 PROCEDURE — 3008F BODY MASS INDEX DOCD: CPT | Mod: CPTII,,, | Performed by: STUDENT IN AN ORGANIZED HEALTH CARE EDUCATION/TRAINING PROGRAM

## 2024-10-07 PROCEDURE — 1160F RVW MEDS BY RX/DR IN RCRD: CPT | Mod: CPTII,,, | Performed by: STUDENT IN AN ORGANIZED HEALTH CARE EDUCATION/TRAINING PROGRAM

## 2024-10-07 PROCEDURE — 3074F SYST BP LT 130 MM HG: CPT | Mod: CPTII,,, | Performed by: STUDENT IN AN ORGANIZED HEALTH CARE EDUCATION/TRAINING PROGRAM

## 2024-10-07 PROCEDURE — 99214 OFFICE O/P EST MOD 30 MIN: CPT | Mod: ,,, | Performed by: STUDENT IN AN ORGANIZED HEALTH CARE EDUCATION/TRAINING PROGRAM

## 2024-10-07 PROCEDURE — 1159F MED LIST DOCD IN RCRD: CPT | Mod: CPTII,,, | Performed by: STUDENT IN AN ORGANIZED HEALTH CARE EDUCATION/TRAINING PROGRAM

## 2024-10-07 PROCEDURE — 3078F DIAST BP <80 MM HG: CPT | Mod: CPTII,,, | Performed by: STUDENT IN AN ORGANIZED HEALTH CARE EDUCATION/TRAINING PROGRAM

## 2024-10-07 PROCEDURE — 3044F HG A1C LEVEL LT 7.0%: CPT | Mod: CPTII,,, | Performed by: STUDENT IN AN ORGANIZED HEALTH CARE EDUCATION/TRAINING PROGRAM

## 2024-10-07 NOTE — TELEPHONE ENCOUNTER
Called [facility] they are running the urine cultural.    [facility]

## 2024-10-07 NOTE — PROGRESS NOTES
Patient ID: [id number]     Chief Complaint: Follow-up        HPI:      Disclaimer:  This note is prepared using voice recognition software and as such is likely to have errors despite attempts at proofreading. Please contact me for questions.     Dante Bro is a 54 y.o. female here today for the following    Acute Issues-  Abnormal mammogram   Recently had a biopsy which demonstrates best papilloma and increased risk of breast cancer score.  Has an appointment with Dr. Fry on 10/10/24.  Reports of having hematoma of breast (left) because of trauma while breast ultrasound.  Mildly tender.  Denies of any family history of breast cancer    Chronic Issues-    -------------------------------------    Cystitis        Past Surgical History:   Procedure Laterality Date    no past surgery         Review of patient's allergies indicates:   Allergen Reactions    Ibuprofen Swelling       Current Outpatient Medications   Medication Instructions    doxepin (SILENOR) 6 mg, Oral, Nightly    zolpidem (AMBIEN) 5 mg, Oral, Nightly PRN       Social History     Socioeconomic History    Marital status:    Tobacco Use    Smoking status: Never     Passive exposure: Never    Smokeless tobacco: Never   Substance and Sexual Activity    Alcohol use: Never    Drug use: Never    Sexual activity: Not Currently     Partners: Male     Birth control/protection: None     Social Drivers of Health     Financial Resource Strain: Low Risk  (4/24/2024)    Overall Financial Resource Strain ([facility])     Difficulty of Paying Living Expenses: Not hard at all   Food Insecurity: No Food Insecurity (4/24/2024)    Hunger Vital Sign     Worried About Running Out of Food in the Last Year: Never true     Ran Out of Food in the Last Year: Never true   Transportation Needs: No Transportation Needs (4/24/2024)    PRAPARE - Transportation     Lack of Transportation (Medical): No     Lack of Transportation (Non-Medical): No   Physical Activity: Sufficiently  "Active (4/24/2024)    Exercise Vital Sign     Days of Exercise per Week: 7 days     Minutes of Exercise per Session: 60 min   Stress: No Stress Concern Present (4/24/2024)    [employer] [employer] - Occupational Stress Questionnaire     Feeling of Stress : Not at all   Housing Stability: Low Risk  (4/24/2024)    Housing Stability Vital Sign     Unable to Pay for Housing in the Last Year: No     Homeless in the Last Year: No        Family History   Problem Relation Name Age of Onset    Liver cancer Father  72    Asthma Brother      Breast cancer Neg Hx          Patient Care Team:  Jeanette Arzola MD as PCP - General (Family Medicine)  Pati Eric RN as Registered Nurse     Subjective:     ROS    See HPI for details    Constitutional: Denies Change in appetite. Denies Chills. Denies Fever. Denies Night sweats.  Respiratory: Denies Cough. Denies Shortness of breath. Denies Shortness of breath with exertion. Denies Wheezing.  Cardiovascular: DeniesChest pain at rest. Denies Chest pain with exertion. Denies Irregular heartbeat. Denies Palpitations. Denies Edema.  Gastrointestinal: Denies Abdominal pain. DeniesDiarrhea. Denies Nausea. Denies Vomiting. Denies Hematemesis or Hematochezia.  Genitourinary: Denies Dysuria. Denies Urinary frequency. Denies Urinary urgency. Denies Blood in urine.  Endocrine: Denies Cold intolerance. Denies Excessive thirst. Denies Heat intolerance. Denies Weight loss. Denies Weight gain.  Musculoskeletal: Denies Painful joints. Denies Weakness.  Integumentary: Denies Rash. Denies Itching. Denies Dry skin.  Neurologic: Denies Dizziness. Denies Fainting. Denies Headache.  Psychiatric: Denies Depression. Denies Anxiety. Denies Suicidal/Homicidal ideations.    All Other ROS: Negative except as stated in HPI.  Objective:     Visit Vitals  /66 (BP Location: Right arm, Patient Position: Sitting)   Pulse (!) 58   Temp 98.7 °F (37.1 °C) (Oral)   Resp 18   Ht 5' 0.47" " (1.536 m)   Wt 46.7 kg (103 lb)   SpO2 98%   BMI 19.80 kg/m²       Physical Exam    General: Alert and oriented, No acute distress.  Respiratory: Clear to auscultation bilaterally; No wheezes, rales or rhonchi,Non-labored respirations, Symmetrical chest wall expansion.  Cardiovascular: Regular rate and rhythm, S1/S2 normal, No murmurs, rubs or gallops.  Gastrointestinal: Soft, Non-tender, Non-distended, Normal bowel sounds, No palpable organomegaly.  Musculoskeletal: Normal range of motion.  Extremities: No clubbing, cyanosis or edema  Neurologic: No focal deficits  Psychiatric: Normal interaction, Coherent speech, Euthymic mood, Appropriate affect   Assessment:       ICD-10-CM ICD-9-CM   1. Abnormal mammogram  R92.8 793.80   2. Bilateral papilloma of breast  D24.1 217    D24.2    3. Bradycardia  R00.1 427.89   4. Well adult health check  Z00.00 V70.0        Plan:     1. Abnormal mammogram  2. Bilateral papilloma of breast  -     MRI Breast Bilateral W WO Contrast; Future; Expected date: 10/07/2024  Please keep scheduled appointment with Dr. Fry    3. Bradycardia  Asymptomatic  Patient is too nervous to discuss about anything today but abnormal mammogram  Plan to discuss about getting an EKG in the next clinic visit    4. RICHARD  Medication offered  CBC offered but patient declined    5.Well adult health check  -     CBC Auto Differential; Future; Expected date: 01/07/2025  -     Comprehensive Metabolic Panel; Future; Expected date: 01/07/2025  -     Lipid Panel; Future; Expected date: 01/07/2025  -     TSH; Future; Expected date: 01/07/2025  -     Hemoglobin A1C; Future; Expected date: 01/07/2025  -     Urinalysis; Future; Expected date: 01/07/2025  -     Vitamin D; Future; Expected date: 01/07/2025  -     T4, Free; Future; Expected date: 01/07/2025  -     Uric Acid; Future; Expected date: 01/07/2025  -     Vitamin B12; Future; Expected date: 10/07/2024  -     Folate; Future; Expected date: 10/07/2024            Medication List with Changes/Refills   Current Medications    DOXEPIN (SILENOR) 6 MG TAB    Take 6 mg by mouth every evening.       Start Date: 4/25/2024 End Date: --    ZOLPIDEM (AMBIEN) 5 MG TAB    Take 1 tablet (5 mg total) by mouth nightly as needed (insomnia).       Start Date: 4/29/2024 End Date: 9/6/2024          Follow up for Annual Wellness.   In addition to their scheduled follow up, the patient has also been instructed to follow up on as needed basis.     Future Appointments   Date Time Provider Department Center   10/9/2024  1:00 PM Elise Fry MD [facility] [place]    4/8/2025  3:00 PM Jeanette Arzola MD [facility] [place]

## 2024-10-09 ENCOUNTER — OFFICE VISIT (OUTPATIENT)
Dept: SURGERY | Facility: CLINIC | Age: 54
End: 2024-10-09
Payer: COMMERCIAL

## 2024-10-09 VITALS
TEMPERATURE: 98 F | DIASTOLIC BLOOD PRESSURE: 75 MMHG | RESPIRATION RATE: 18 BRPM | BODY MASS INDEX: 19.83 KG/M2 | HEART RATE: 55 BPM | OXYGEN SATURATION: 100 % | HEIGHT: 60 IN | WEIGHT: 101 LBS | SYSTOLIC BLOOD PRESSURE: 114 MMHG

## 2024-10-09 DIAGNOSIS — D24.1 BILATERAL PAPILLOMA OF BREAST: ICD-10-CM

## 2024-10-09 DIAGNOSIS — D24.2 BILATERAL PAPILLOMA OF BREAST: ICD-10-CM

## 2024-10-09 DIAGNOSIS — N60.99 INTRADUCTAL PAPILLOMA WITH ATYPICAL DUCTAL HYPERPLASIA OF BREAST: Primary | ICD-10-CM

## 2024-10-09 DIAGNOSIS — D24.9 INTRADUCTAL PAPILLOMA WITH ATYPICAL DUCTAL HYPERPLASIA OF BREAST: Primary | ICD-10-CM

## 2024-10-09 PROCEDURE — 3074F SYST BP LT 130 MM HG: CPT | Mod: CPTII,S$GLB,, | Performed by: STUDENT IN AN ORGANIZED HEALTH CARE EDUCATION/TRAINING PROGRAM

## 2024-10-09 PROCEDURE — 3044F HG A1C LEVEL LT 7.0%: CPT | Mod: CPTII,S$GLB,, | Performed by: STUDENT IN AN ORGANIZED HEALTH CARE EDUCATION/TRAINING PROGRAM

## 2024-10-09 PROCEDURE — 99204 OFFICE O/P NEW MOD 45 MIN: CPT | Mod: S$GLB,,, | Performed by: STUDENT IN AN ORGANIZED HEALTH CARE EDUCATION/TRAINING PROGRAM

## 2024-10-09 PROCEDURE — 3008F BODY MASS INDEX DOCD: CPT | Mod: CPTII,S$GLB,, | Performed by: STUDENT IN AN ORGANIZED HEALTH CARE EDUCATION/TRAINING PROGRAM

## 2024-10-09 PROCEDURE — 1160F RVW MEDS BY RX/DR IN RCRD: CPT | Mod: CPTII,S$GLB,, | Performed by: STUDENT IN AN ORGANIZED HEALTH CARE EDUCATION/TRAINING PROGRAM

## 2024-10-09 PROCEDURE — 3078F DIAST BP <80 MM HG: CPT | Mod: CPTII,S$GLB,, | Performed by: STUDENT IN AN ORGANIZED HEALTH CARE EDUCATION/TRAINING PROGRAM

## 2024-10-09 PROCEDURE — 1159F MED LIST DOCD IN RCRD: CPT | Mod: CPTII,S$GLB,, | Performed by: STUDENT IN AN ORGANIZED HEALTH CARE EDUCATION/TRAINING PROGRAM

## 2024-10-09 PROCEDURE — 99999 PR PBB SHADOW E&M-EST. PATIENT-LVL IV: CPT | Mod: PBBFAC,,, | Performed by: STUDENT IN AN ORGANIZED HEALTH CARE EDUCATION/TRAINING PROGRAM

## 2024-10-09 NOTE — PROGRESS NOTES
[facility] Breast Surg  Breast Surgical Oncology  New Patient Office Visit - H&P      Care Team: Patient Care Team:  Jeanette Arzola MD as PCP - General (Family Medicine)  Pati Eric, RN as Registered Nurse   Referring Provider: Dr. Jeanette Arzola    Chief Complaint:   Chief Complaint   Patient presents with    Breast Mass     Patient c/o right breast lump x 2 weeks no redness, no swelling, no nipple discharge           Subjective:      History of Present Illness:  Dante Bro is a pleasant 54 y.o.female who presents as a referral from Jeanette Arzola MD for BL breast papillomas. She recently had four biopsies (2 in each breast) and she has an atypical papilloma in the left breast and a papilloma without atypia in the right breast.  One of the biopsies in her right breast she developed swelling about the size of a tomato which has decreased in size.  And she is still relatively sore in that area.  She has never had any breast surgeries or biopsies prior to this. She did have R nipple discharge about 4 years ago that was clear or brown but resolved and she denies any current concerns with her breasts, prior to her biopsy.   No family history of breast cancer or ovarian cancer. She has 2 children.  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     Imaging:   Bilateral diagnostic mammogram 08/19/2024:  Density C, BI-RADS 4.  Right breast:  1:00 a.m. axis posterior depth there grouped coarse heterogeneous calcifications that are suspicious.  Left breast: 11-1 o'clock axis posterior to anterior depth there heterogeneous coarse calcifications that are suspicious.  In the 9:00 left breast subareolar there is a oval mass.  Ultrasound:  Right breast at 12-1 o'clock, 1 cm from the nipple there is a 0.7 x 0.5 x 0.7 cm vascular intraductal mass.  This correlates with the patient's history of right nipple discharge in his suspicious.  In the left breast 9:00 a.m. axis  subareolar position there is a 1 x 0.5 x 0.9 cm oval mass with indistinct margins.  In the left breast 11:00 a.m. axis there is mild duct ectasia with echogenic intraductal foci compatible with calcifications and this correlates with the calcifications seen on mammogram in the 11-1 o'clock axis.  Biopsy bilateral breast masses 09/26/2024:  Site #1: Left breast 11:00-1:00 axes posterior to anterior depths segmental coarse heterogeneous calcifications ; T3-coil clip  Site #2: Right breast 1:00 axis posterior depth grouped coarse heterogeneous calcifications ; T3-coil clip  Site #3: Left breast 9:00 axis subareolar mass ; T4-butterfly clip  Site #4: Right breast 12:30 axis 1 cm FN intraductal mass ; T1-barrel clip    I have reviewed the imaging and agree with the radiologists interpretation. I have discussed these results with the patient.    Pathology:   CORE BREAST BIOPSIES 9/26/24:  Site #1: Left breast 11:00-1:00 axes posterior to anterior depths calcifications ; T3-coil clip:  Florid ductal hyperplasia of the usual type, focal.  Mammary duct ectasia.  Fibroadenomatoid change with microcalcifications.  Papillary lesion, fragmented and minimally represented, not further classified.  Site #2: Right breast 1:00 axis posterior depth calcifications ; T3-coil clip:  Sclerosing adenosis with the associated abundant microcalcifications, no evidence of malignancy.  Columnar cell changes.  Fibroadenoma.  Left breast, subareolar mass at 9:00 core biopsy:  Intraductal papilloma with superimposed florid epithelial hyperplasia, atypical ductal hyperplasia and apocrine metaplasia, atypical papilloma.  Right breast mass at 12:30, 1 cm from nipple core biopsy:  Intraductal papilloma with superimposed florid epithelial hyperplasia of the usual type and apocrine metaplasia.       OB / GYN History   Menarche Onset:12  Menopause: Menopause: perimenopausal  Hormonal birth control (duration): 0years  Pregnancies: 2  Age at first child  birth: 33  Child births: 2  Hysterectomy/Oophorectomy: no  HRT: no    Family History of Cancer (& age at diagnosis):  -   Family History   Problem Relation Name Age of Onset    Liver cancer Father Earle Gutiérrez 72    Cancer Father Earle Gutiérrez     Asthma Brother Braulio Gutiérrez     Breast cancer Neg Hx          Lifestyle:  Height and Weight:   BMI: Body mass index is 19.73 kg/m².     Other:  MG breast density: C  Prior thoracic RT: none  Genetic testing: No  Ashkenazi Christianity descent: No    Other History:     Past Medical History:   Diagnosis Date    Cystitis         Past Surgical History:   Procedure Laterality Date    no past surgery          Social History     Socioeconomic History    Marital status:    Tobacco Use    Smoking status: Never     Passive exposure: Never    Smokeless tobacco: Never   Substance and Sexual Activity    Alcohol use: Never    Drug use: Never    Sexual activity: Not Currently     Partners: Male     Birth control/protection: None     Social Drivers of Health     Financial Resource Strain: Low Risk  (4/24/2024)    Overall Financial Resource Strain (CARDIA)     Difficulty of Paying Living Expenses: Not hard at all   Food Insecurity: No Food Insecurity (4/24/2024)    Hunger Vital Sign     Worried About Running Out of Food in the Last Year: Never true     Ran Out of Food in the Last Year: Never true   Transportation Needs: No Transportation Needs (4/24/2024)    PRAPARE - Transportation     Lack of Transportation (Medical): No     Lack of Transportation (Non-Medical): No   Physical Activity: Sufficiently Active (4/24/2024)    Exercise Vital Sign     Days of Exercise per Week: 7 days     Minutes of Exercise per Session: 60 min   Stress: No Stress Concern Present (4/24/2024)    [place] [employer] - Occupational Stress Questionnaire     Feeling of Stress : Not at all   Housing Stability: Low Risk  (4/24/2024)    Housing Stability Vital Sign     Unable to  Pay for Housing in the Last Year: No     Homeless in the Last Year: No        Immunization History   Administered Date(s) Administered    COVID-19, MRNA, LN-S, PF (Pfizer) (Purple Cap) 09/10/2021, 10/01/2021    Influenza - Quadrivalent - PF *Preferred* (6 months and older) 11/29/2022, 11/16/2023        Medications/Allergies:       Current Outpatient Medications   Medication Instructions    ascorbic acid (VITAMIN C ORAL) Take by mouth.    doxepin (SILENOR) 6 mg, Oral, Nightly    zolpidem (AMBIEN) 5 mg, Oral, Nightly PRN       Review of patient's allergies indicates:   Allergen Reactions    Ibuprofen Swelling        Review of Systems:      ROS    Constitutional: denies fevers, chills, weight loss  HEENT: denies blurry/double vision, changes in hearing, odynophagia, dysphagia  Respiratory: denies cough, shortness of breath  Cardiovascular: denies palpitations, swelling of the extremities  GI: denies abdominal pain, nausea/vomiting, hematochezia, frequent stools  : denies frequency, dysuria, flank pain, hematuria  Skin: denies new rashes  Neurological: denies muscular/sensory deficiencies, loss of coordination, headaches, memory changes  Endo: denies hair loss/thinning, nervousness, hot flashes, heat/cold intolerance, lumps in the neck area  Heme: denies easy bruising and fatigue  Psychological: denies anxious/depressive moods  Musculoskeletal: denies bony pain, muscle cramps, swollen joints       Objective/Physical Exam   Visit Vitals  /75 (BP Location: Left arm, Patient Position: Sitting)   Pulse (!) 55   Temp 98.1 °F (36.7 °C) (Oral)   Resp 18   Ht 5' (1.524 m)   Wt 45.8 kg (101 lb)   SpO2 100%   BMI 19.73 kg/m²        Physical Exam    General: The patient is awake, alert and oriented. The patient is well nourished. No acute distress.  Neck: The neck is supple. The thyroid is not enlarged.  Musculoskeletal: The patient has a normal range of motion of her bilateral upper extremities.  Heart: Regular rate and  rhythm, no murmurs.   Lung: No increased work of breathing. Clear breath sounds bilaterally.  Lymph nodes: There is no axillary, supraclavicular or cervical lymphadenopathy.  Breast:  Right:   On inspection there is no skin dimpling, rashes, retraction, erythema or skin abnormalities. The nipple areolar complex is normal with an everted nipple. The breasts move normally with movement of the pectoralis muscle. On palpation there is a mass (hematoma in the superior breast 12-1 o'clock associated with tenderness, that measures about 3x3cm.  There is no nipple discharge.  Left:   On inspection there is no skin dimpling, rashes, retraction, erythema or skin abnormalities. The nipple areolar complex is normal with an everted nipple. The breasts move normally with movement of the pectoralis muscle. On palpation she has nodularity or likely hematoma around the biopsy site at 9 o'clock that measures about 1.5cm.  There is no tenderness. There is no nipple discharge.     Assessment and Plan     1. Bilateral papilloma of breast  - Ambulatory referral/consult to Breast Surgery    2. Intraductal papilloma with atypical ductal hyperplasia of breast    Dante Bro is a 54 y.o.female who presents with bilateral breast papillomas with ADH in the left papilloma.  L breast papilloma is located at 9, SA measuring 1cm and the R papilloma is at 1230, 1CFN, measuring 0.7cm.  We discussed that papillomas consist of a monotonous array of papillary cells that grow from the wall of a cyst into its lumen and the majority of them can be safely monitored.  However, if a papilloma has any atypia, symptoms, or pathology-imaging discordance, excision is recommended due to increase in the upgrade rate to cancer >20%.  A papilloma without atypia has an upgrade rate of 0-6%.  The left papilloma is associated with atypia and I recommend surgical excision. The right papilloma can be observed or excised.  She would prefer excision of the papilloma in her  R breast as well at the same time.      Due to the increase in breast cancer risk in the future with this lesion, she was plugged into the high risk calculator and her lifetime risk of cancer is about 29.1%.  We will obtain an MRI prior to surgery given her breast density and her high-risk status.  We will have her return to clinic after the MRI to finalize surgical plans.  All questions were answered.       Elise Fry MD  Breast Surgical Oncology       I spent a total of 45 minutes on the day of the visit.  This includes face to face time and non-face to face time preparing to see the patient (eg, review of tests), obtaining and/or reviewing separately obtained history, documenting clinical information in the electronic or other health record, independently interpreting results and communicating results to the patient/family/caregiver, or care coordinator.

## 2024-10-10 ENCOUNTER — PATIENT MESSAGE (OUTPATIENT)
Dept: FAMILY MEDICINE | Facility: CLINIC | Age: 54
End: 2024-10-10
Payer: COMMERCIAL

## 2024-10-10 ENCOUNTER — TELEPHONE (OUTPATIENT)
Dept: FAMILY MEDICINE | Facility: CLINIC | Age: 54
End: 2024-10-10
Payer: COMMERCIAL

## 2024-10-10 DIAGNOSIS — D24.1 BILATERAL PAPILLOMA OF BREAST: Primary | ICD-10-CM

## 2024-10-10 DIAGNOSIS — D24.2 BILATERAL PAPILLOMA OF BREAST: Primary | ICD-10-CM

## 2024-10-10 NOTE — TELEPHONE ENCOUNTER
----- Message from Jeanette Arzola MD sent at 10/9/2024  6:33 PM CDT -----  Please inform patient of breast biopsy results  .     Surgical consultation is recommended.   Please keep scheduled visit with Dr. Elise Fry.     Thanks,    Dr. Arzola

## 2024-11-14 ENCOUNTER — OFFICE VISIT (OUTPATIENT)
Dept: FAMILY MEDICINE | Facility: CLINIC | Age: 54
End: 2024-11-14
Payer: COMMERCIAL

## 2024-11-14 VITALS
DIASTOLIC BLOOD PRESSURE: 66 MMHG | HEART RATE: 69 BPM | WEIGHT: 101 LBS | TEMPERATURE: 98 F | SYSTOLIC BLOOD PRESSURE: 105 MMHG | HEIGHT: 60 IN | RESPIRATION RATE: 18 BRPM | OXYGEN SATURATION: 99 % | BODY MASS INDEX: 19.83 KG/M2

## 2024-11-14 DIAGNOSIS — F41.1 GAD (GENERALIZED ANXIETY DISORDER): ICD-10-CM

## 2024-11-14 DIAGNOSIS — D24.1 BILATERAL PAPILLOMA OF BREAST: ICD-10-CM

## 2024-11-14 DIAGNOSIS — D24.2 BILATERAL PAPILLOMA OF BREAST: ICD-10-CM

## 2024-11-14 DIAGNOSIS — R93.5 ABNORMAL ABDOMINAL CT SCAN: ICD-10-CM

## 2024-11-14 PROCEDURE — 3078F DIAST BP <80 MM HG: CPT | Mod: CPTII,,, | Performed by: STUDENT IN AN ORGANIZED HEALTH CARE EDUCATION/TRAINING PROGRAM

## 2024-11-14 PROCEDURE — 3008F BODY MASS INDEX DOCD: CPT | Mod: CPTII,,, | Performed by: STUDENT IN AN ORGANIZED HEALTH CARE EDUCATION/TRAINING PROGRAM

## 2024-11-14 PROCEDURE — 1159F MED LIST DOCD IN RCRD: CPT | Mod: CPTII,,, | Performed by: STUDENT IN AN ORGANIZED HEALTH CARE EDUCATION/TRAINING PROGRAM

## 2024-11-14 PROCEDURE — 3074F SYST BP LT 130 MM HG: CPT | Mod: CPTII,,, | Performed by: STUDENT IN AN ORGANIZED HEALTH CARE EDUCATION/TRAINING PROGRAM

## 2024-11-14 PROCEDURE — 99214 OFFICE O/P EST MOD 30 MIN: CPT | Mod: ,,, | Performed by: STUDENT IN AN ORGANIZED HEALTH CARE EDUCATION/TRAINING PROGRAM

## 2024-11-14 PROCEDURE — 1160F RVW MEDS BY RX/DR IN RCRD: CPT | Mod: CPTII,,, | Performed by: STUDENT IN AN ORGANIZED HEALTH CARE EDUCATION/TRAINING PROGRAM

## 2024-11-14 PROCEDURE — 3044F HG A1C LEVEL LT 7.0%: CPT | Mod: CPTII,,, | Performed by: STUDENT IN AN ORGANIZED HEALTH CARE EDUCATION/TRAINING PROGRAM

## 2024-11-14 NOTE — PROGRESS NOTES
Patient ID: [id number]     Chief Complaint: Follow-up      HPI:      Disclaimer:  This note is prepared using voice recognition software and as such is likely to have errors despite attempts at proofreading. Please contact me for questions.     Dante Bro is a 54 y.o. female here today for the following      Acute Issues-  Ms. Bro comes to the clinic to discuss the results of CT abdomen and pelvis performed on 03/07/2024 demonstrated Patchy infiltrative process versus atelectasis in the lingula incompletely evaluated on this examination.  Patient in his asymptomatic today but is anxious and would like to get an imaging done to see if she continues to have atelectasis/patchy infiltrative process    Chronic Issues-    -------------------------------------    Cystitis        Past Surgical History:   Procedure Laterality Date    no past surgery         Review of patient's allergies indicates:   Allergen Reactions    Ibuprofen Swelling       Current Outpatient Medications   Medication Instructions    ascorbic acid (VITAMIN C ORAL) Take by mouth.    doxepin (SILENOR) 6 mg, Oral, Nightly    zolpidem (AMBIEN) 5 mg, Oral, Nightly PRN       Social History     Socioeconomic History    Marital status:    Tobacco Use    Smoking status: Never     Passive exposure: Never    Smokeless tobacco: Never   Substance and Sexual Activity    Alcohol use: Never    Drug use: Never    Sexual activity: Not Currently     Partners: Male     Birth control/protection: None     Social Drivers of Health     Financial Resource Strain: Low Risk  (4/24/2024)    Overall Financial Resource Strain (CARDIA)     Difficulty of Paying Living Expenses: Not hard at all   Food Insecurity: No Food Insecurity (4/24/2024)    Hunger Vital Sign     Worried About Running Out of Food in the Last Year: Never true     Ran Out of Food in the Last Year: Never true   Transportation Needs: No Transportation Needs (4/24/2024)    PRAPARE - Transportation     Lack of  Transportation (Medical): No     Lack of Transportation (Non-Medical): No   Physical Activity: Sufficiently Active (4/24/2024)    Exercise Vital Sign     Days of Exercise per Week: 7 days     Minutes of Exercise per Session: 60 min   Stress: No Stress Concern Present (4/24/2024)    [employer] [employer] - Occupational Stress Questionnaire     Feeling of Stress : Not at all   Housing Stability: Low Risk  (4/24/2024)    Housing Stability Vital Sign     Unable to Pay for Housing in the Last Year: No     Homeless in the Last Year: No        Family History   Problem Relation Name Age of Onset    Liver cancer Father Earle Gutiérrez 72    Cancer Father Earle Gutiérrez     Asthma Brother Braulio Gutiérrez     Breast cancer Neg Hx          Patient Care Team:  Jeanette Arzola MD as PCP - General (Family Medicine)  aPti Eric RN as Registered Nurse     Subjective:     ROS    See HPI for details    Constitutional: Denies Change in appetite. Denies Chills. Denies Fever. Denies Night sweats.  Respiratory: Denies Cough. Denies Shortness of breath. Denies Shortness of breath with exertion. Denies Wheezing.  Cardiovascular: DeniesChest pain at rest. Denies Chest pain with exertion. Denies Irregular heartbeat. Denies Palpitations. Denies Edema.  Gastrointestinal: Denies Abdominal pain. DeniesDiarrhea. Denies Nausea. Denies Vomiting. Denies Hematemesis or Hematochezia.  Genitourinary: Denies Dysuria. Denies Urinary frequency. Denies Urinary urgency. Denies Blood in urine.  Endocrine: Denies Cold intolerance. Denies Excessive thirst. Denies Heat intolerance. Denies Weight loss. Denies Weight gain.  Musculoskeletal: Denies Painful joints. Denies Weakness.  Integumentary: Denies Rash. Denies Itching. Denies Dry skin.  Neurologic: Denies Dizziness. Denies Fainting. Denies Headache.  Psychiatric: Denies Depression. Denies Anxiety. Denies Suicidal/Homicidal ideations.    All Other ROS: Negative except as  stated in HPI.  Objective:     Visit Vitals  /66 (BP Location: Left arm, Patient Position: Sitting)   Pulse 69   Temp 98.3 °F (36.8 °C) (Oral)   Resp 18   Ht 5' (1.524 m)   Wt 45.8 kg (101 lb)   SpO2 99%   BMI 19.73 kg/m²       Physical Exam    General: Alert and oriented, No acute distress.  Respiratory: Clear to auscultation bilaterally; No wheezes, rales or rhonchi,Non-labored respirations, Symmetrical chest wall expansion.  Cardiovascular: Regular rate and rhythm, S1/S2 normal, No murmurs, rubs or gallops.  Gastrointestinal: Soft, Non-tender, Non-distended, Normal bowel sounds, No palpable organomegaly.  Musculoskeletal: Normal range of motion.  Extremities: No clubbing, cyanosis or edema  Neurologic: No focal deficits  Psychiatric: Normal interaction, Coherent speech, Euthymic mood, Appropriate affect   Assessment:       ICD-10-CM ICD-9-CM   1. Abnormal abdominal CT scan  R93.5 793.6   2. Bilateral papilloma of breast  D24.1 217    D24.2    3. RICHARD (generalized anxiety disorder)  F41.1 300.02        Plan:     1. Abnormal abdominal CT scan  -     CT Chest Without Contrast; Future; Expected date: 11/14/2024  -     X-Ray Chest PA And Lateral; Future; Expected date: 11/14/2024  Currently asymptomatic   Patient would like to get imaging done anyway    2. Bilateral papilloma of breast  Keep scheduled visit    3. RICHARD (generalized anxiety disorder)  Medication offered   Patient declined   May consider in future   Continue yoga           Medication List with Changes/Refills   Current Medications    ASCORBIC ACID (VITAMIN C ORAL)    Take by mouth.       Start Date: --        End Date: --    DOXEPIN (SILENOR) 6 MG TAB    Take 6 mg by mouth every evening.       Start Date: 4/25/2024 End Date: --    ZOLPIDEM (AMBIEN) 5 MG TAB    Take 1 tablet (5 mg total) by mouth nightly as needed (insomnia).       Start Date: 4/29/2024 End Date: 9/6/2024          Follow up in about 4 months (around 3/14/2025) for after Surgery.   In  addition to their scheduled follow up, the patient has also been instructed to follow up on as needed basis.     Future Appointments   Date Time Provider Department Center   11/18/2024  1:00 PM [facility]   12/2/2024 10:45 AM [place] [id number] [facility]   12/2/2024 11:15 AM [place] [id number] [facility]   4/8/2025  3:00 PM Jeanette Arzola MD [facility] [place]    10/8/2025  9:00 AM [facility]

## 2024-11-18 ENCOUNTER — OFFICE VISIT (OUTPATIENT)
Dept: SURGERY | Facility: CLINIC | Age: 54
End: 2024-11-18
Payer: COMMERCIAL

## 2024-11-18 VITALS
OXYGEN SATURATION: 100 % | BODY MASS INDEX: 20.03 KG/M2 | HEART RATE: 71 BPM | TEMPERATURE: 98 F | DIASTOLIC BLOOD PRESSURE: 75 MMHG | SYSTOLIC BLOOD PRESSURE: 113 MMHG | WEIGHT: 102 LBS | RESPIRATION RATE: 18 BRPM | HEIGHT: 60 IN

## 2024-11-18 DIAGNOSIS — N60.99 INTRADUCTAL PAPILLOMA WITH ATYPICAL DUCTAL HYPERPLASIA OF BREAST: Primary | ICD-10-CM

## 2024-11-18 DIAGNOSIS — Z91.89 AT HIGH RISK FOR BREAST CANCER: ICD-10-CM

## 2024-11-18 DIAGNOSIS — Z01.818 PREOP EXAMINATION: ICD-10-CM

## 2024-11-18 DIAGNOSIS — D24.9 INTRADUCTAL PAPILLOMA WITH ATYPICAL DUCTAL HYPERPLASIA OF BREAST: Primary | ICD-10-CM

## 2024-11-18 PROCEDURE — 1160F RVW MEDS BY RX/DR IN RCRD: CPT | Mod: CPTII,S$GLB,, | Performed by: STUDENT IN AN ORGANIZED HEALTH CARE EDUCATION/TRAINING PROGRAM

## 2024-11-18 PROCEDURE — 99999 PR PBB SHADOW E&M-EST. PATIENT-LVL IV: CPT | Mod: PBBFAC,,, | Performed by: STUDENT IN AN ORGANIZED HEALTH CARE EDUCATION/TRAINING PROGRAM

## 2024-11-18 PROCEDURE — 3074F SYST BP LT 130 MM HG: CPT | Mod: CPTII,S$GLB,, | Performed by: STUDENT IN AN ORGANIZED HEALTH CARE EDUCATION/TRAINING PROGRAM

## 2024-11-18 PROCEDURE — 3078F DIAST BP <80 MM HG: CPT | Mod: CPTII,S$GLB,, | Performed by: STUDENT IN AN ORGANIZED HEALTH CARE EDUCATION/TRAINING PROGRAM

## 2024-11-18 PROCEDURE — 3008F BODY MASS INDEX DOCD: CPT | Mod: CPTII,S$GLB,, | Performed by: STUDENT IN AN ORGANIZED HEALTH CARE EDUCATION/TRAINING PROGRAM

## 2024-11-18 PROCEDURE — 99212 OFFICE O/P EST SF 10 MIN: CPT | Mod: S$GLB,,, | Performed by: STUDENT IN AN ORGANIZED HEALTH CARE EDUCATION/TRAINING PROGRAM

## 2024-11-18 PROCEDURE — 3044F HG A1C LEVEL LT 7.0%: CPT | Mod: CPTII,S$GLB,, | Performed by: STUDENT IN AN ORGANIZED HEALTH CARE EDUCATION/TRAINING PROGRAM

## 2024-11-18 PROCEDURE — 1159F MED LIST DOCD IN RCRD: CPT | Mod: CPTII,S$GLB,, | Performed by: STUDENT IN AN ORGANIZED HEALTH CARE EDUCATION/TRAINING PROGRAM

## 2024-11-18 NOTE — PROGRESS NOTES
[facility] Breast Surg  Breast Surgical Oncology  Established Patient Office Visit - H&P      Care Team: Patient Care Team:  Jeanette Arzola MD as PCP - General (Family Medicine)  Pati Eric, RN as Registered Nurse   Referring Provider: No ref. provider found    Chief Complaint:   Chief Complaint   Patient presents with    Follow-up     Patient reports no breast related concerns          Subjective:      Interval:   Dante Bro is a 54 y.o.female who presents today for preop. She denies any changes with her breast since she was last here. MRI did not show any other suspicious findings.       History of Present Illness:  Dante Bro is a pleasant 54 y.o.female who presents as a referral from Jeanette Arzola MD for BL breast papillomas. She recently had four biopsies (2 in each breast) and she has an atypical papilloma in the left breast and a papilloma without atypia in the right breast.  One of the biopsies in her right breast she developed swelling about the size of a tomato which has decreased in size.  And she is still relatively sore in that area.  She has never had any breast surgeries or biopsies prior to this. She did have R nipple discharge about 4 years ago that was clear or brown but resolved and she denies any current concerns with her breasts, prior to her biopsy.   No family history of breast cancer or ovarian cancer. She has 2 children.  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     Lifetime risk of breast cancer calculated 29%.     Imaging:   Bilateral diagnostic mammogram 08/19/2024:  Density C, BI-RADS 4.  Right breast:  1:00 a.m. axis posterior depth there grouped coarse heterogeneous calcifications that are suspicious.  Left breast: 11-1 o'clock axis posterior to anterior depth there heterogeneous coarse calcifications that are suspicious.  In the 9:00 left breast subareolar there is a oval mass.  Ultrasound:  Right breast at 12-1 o'clock, 1 cm  from the nipple there is a 0.7 x 0.5 x 0.7 cm vascular intraductal mass.  This correlates with the patient's history of right nipple discharge in his suspicious.  In the left breast 9:00 axis subareolar position there is a 1 x 0.5 x 0.9 cm oval mass with indistinct margins.  In the left breast 11:00 axis there is mild duct ectasia with echogenic intraductal foci compatible with calcifications and this correlates with the calcifications seen on mammogram in the 11-1 o'clock axis.  Biopsy bilateral breast masses 09/26/2024:  Site #1: Left breast 11:00-1:00 axes posterior to anterior depths segmental coarse heterogeneous calcifications ; T3-coil clip  Site #2: Right breast 1:00 axis posterior depth grouped coarse heterogeneous calcifications ; T3-coil clip  Site #3: Left breast 9:00 axis subareolar mass ; T4-butterfly clip  Site #4: Right breast 12:30 axis 1 cm FN intraductal mass ; T1-barrel clip  MRI breast 11/05/2024: BIRADS 2.   Right breast:   In the central region anterior depth, there is postsurgical change and a metallic clip (T1-barrel) related to recent ultrasound-guided core needle biopsy of an intraductal papilloma.  There is no enhancement unique to the BPE at or around this core needle biopsy site.  In the 1:00 axis posterior depth, there is postsurgical change and a metallic clip (T3-coil) related to recent benign stereotactic guided core needle biopsy of calcifications.  There is no enhancement unique to the BPE at or around this core needle biopsy site.   Left breast:  In the 12:00 axis middle depth, there is postsurgical change and a metallic clip (T3-coil) related to recent benign stereotactic guided core needle biopsy of calcifications.  There is no enhancement unique to the BPE at or around this core needle biopsy site.  In the 9:00 axis central region middle depth, there is postsurgical change and a metallic clip (T4-butterfly) related to recent high-risk benign ultrasound-guided core needle  biopsy of an atypical papilloma.  There is no enhancement unique to the BPE at or around this core needle biopsy site.    I have reviewed the imaging and agree with the radiologists interpretation. I have discussed these results with the patient.    Pathology:   CORE BREAST BIOPSIES 9/26/24:  Site #1: Left breast 11:00-1:00 axes posterior to anterior depths calcifications ; T3-coil clip:  Florid ductal hyperplasia of the usual type, focal.  Mammary duct ectasia.  Fibroadenomatoid change with microcalcifications.  Papillary lesion, fragmented and minimally represented, not further classified.  Site #2: Right breast 1:00 axis posterior depth calcifications ; T3-coil clip:  Sclerosing adenosis with the associated abundant microcalcifications, no evidence of malignancy.  Columnar cell changes.  Fibroadenoma.  Left breast, subareolar mass at 9:00 core biopsy:  Intraductal papilloma with superimposed florid epithelial hyperplasia, atypical ductal hyperplasia and apocrine metaplasia, atypical papilloma.  Right breast mass at 12:30, 1 cm from nipple core biopsy:  Intraductal papilloma with superimposed florid epithelial hyperplasia of the usual type and apocrine metaplasia.       OB / GYN History   Menarche Onset:12  Menopause: Menopause: perimenopausal  Hormonal birth control (duration): 0years  Pregnancies: 2  Age at first child birth: 33  Child births: 2  Hysterectomy/Oophorectomy: no  HRT: no    Family History of Cancer (& age at diagnosis):  -   Family History   Problem Relation Name Age of Onset    Liver cancer Father Earle Gutiérrez 72    Cancer Father Earle Gutiérrez     Asthma Brother Braulio Gutiérrez     Breast cancer Neg Hx          Lifestyle:  Height and Weight:   BMI: Body mass index is 19.92 kg/m².     Other:  MG breast density: C  Prior thoracic RT: none  Genetic testing: No  Ashkenazi [employer] descent: No    Other History:     Past Medical History:   Diagnosis Date    Cystitis         Past Surgical  History:   Procedure Laterality Date    no past surgery          Social History     Socioeconomic History    Marital status:    Tobacco Use    Smoking status: Never     Passive exposure: Never    Smokeless tobacco: Never   Substance and Sexual Activity    Alcohol use: Never    Drug use: Never    Sexual activity: Not Currently     Partners: Male     Birth control/protection: None     Social Drivers of Health     Financial Resource Strain: Low Risk  (4/24/2024)    Overall Financial Resource Strain (CARDIA)     Difficulty of Paying Living Expenses: Not hard at all   Food Insecurity: No Food Insecurity (4/24/2024)    Hunger Vital Sign     Worried About Running Out of Food in the Last Year: Never true     Ran Out of Food in the Last Year: Never true   Transportation Needs: No Transportation Needs (4/24/2024)    PRAPARE - Transportation     Lack of Transportation (Medical): No     Lack of Transportation (Non-Medical): No   Physical Activity: Sufficiently Active (4/24/2024)    Exercise Vital Sign     Days of Exercise per Week: 7 days     Minutes of Exercise per Session: 60 min   Stress: No Stress Concern Present (4/24/2024)    [place] [employer] - Occupational Stress Questionnaire     Feeling of Stress : Not at all   Housing Stability: Low Risk  (4/24/2024)    Housing Stability Vital Sign     Unable to Pay for Housing in the Last Year: No     Homeless in the Last Year: No        Immunization History   Administered Date(s) Administered    COVID-19, MRNA, LN-S, PF (Pfizer) (Purple Cap) 09/10/2021, 10/01/2021    Influenza - Quadrivalent - PF *Preferred* (6 months and older) 11/29/2022, 11/16/2023    Influenza - Trivalent - Fluarix, Flulaval, Fluzone, Afluria - PF 11/15/2024        Medications/Allergies:       Current Outpatient Medications   Medication Instructions    ascorbic acid (VITAMIN C ORAL) Take by mouth.    doxepin (SILENOR) 6 mg, Oral, Nightly    zolpidem (AMBIEN) 5 mg, Oral, Nightly  PRN       Review of patient's allergies indicates:   Allergen Reactions    Ibuprofen Swelling        Review of Systems:      ROS  See HPI for pertinent ROS        Objective/Physical Exam   Visit Vitals  /75 (BP Location: Left arm, Patient Position: Sitting)   Pulse 71   Temp 97.6 °F (36.4 °C) (Oral)   Resp 18   Ht 5' (1.524 m)   Wt 46.3 kg (102 lb)   LMP 08/01/2024 (Approximate)   SpO2 100%   BMI 19.92 kg/m²          Physical Exam    General: The patient is awake, alert and oriented. The patient is well nourished. No acute distress.  Heart: Regular rate and rhythm, no murmurs.   Lung: No increased work of breathing. Clear breath sounds bilaterally.  Lymph nodes: There is no axillary, supraclavicular or cervical lymphadenopathy.  Breast: not examined again today      Assessment and Plan     1. Intraductal papilloma with atypical ductal hyperplasia of breast    2. Preop examination      Dante Bro is a 54 y.o.female who presents for preop for BL excisional biopsy. She has bilateral breast papillomas with ADH in the left papilloma.  MRI did not show any additional suspicious findings.   L breast papilloma is located at 9, SA measuring 1cm and the R papilloma is at 1230, 1CFN, measuring 0.7cm.    Risks and benefits of bilateral breast excisional biopsy were discussed with her which include pain, infection, bleeding, scarring, and potential need to additional surgery.  Seed localization has already been scheduled.  Consent was signed and all questions answered.      Elise Fry MD  Breast Surgical Oncology       I spent a total of 15 minutes on the day of the visit.  This includes face to face time and non-face to face time preparing to see the patient (eg, review of tests), obtaining and/or reviewing separately obtained history, documenting clinical information in the electronic or other health record, independently interpreting results and communicating results to the patient/family/caregiver, or care  coordinator.

## 2024-11-18 NOTE — H&P (VIEW-ONLY)
[facility] Breast Surg  Breast Surgical Oncology  Established Patient Office Visit - H&P      Care Team: Patient Care Team:  Jeanette Arzola MD as PCP - General (Family Medicine)  Pati Eric, RN as Registered Nurse   Referring Provider: No ref. provider found    Chief Complaint:   Chief Complaint   Patient presents with    Follow-up     Patient reports no breast related concerns          Subjective:      Interval:   Dante Bro is a 54 y.o.female who presents today for preop. She denies any changes with her breast since she was last here. MRI did not show any other suspicious findings.       History of Present Illness:  Dante Bro is a pleasant 54 y.o.female who presents as a referral from Jeanette Arzola MD for BL breast papillomas. She recently had four biopsies (2 in each breast) and she has an atypical papilloma in the left breast and a papilloma without atypia in the right breast.  One of the biopsies in her right breast she developed swelling about the size of a tomato which has decreased in size.  And she is still relatively sore in that area.  She has never had any breast surgeries or biopsies prior to this. She did have R nipple discharge about 4 years ago that was clear or brown but resolved and she denies any current concerns with her breasts, prior to her biopsy.   No family history of breast cancer or ovarian cancer. She has 2 children.  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     Lifetime risk of breast cancer calculated 29%.     Imaging:   Bilateral diagnostic mammogram 08/19/2024:  Density C, BI-RADS 4.  Right breast:  1:00 a.m. axis posterior depth there grouped coarse heterogeneous calcifications that are suspicious.  Left breast: 11-1 o'clock axis posterior to anterior depth there heterogeneous coarse calcifications that are suspicious.  In the 9:00 left breast subareolar there is a oval mass.  Ultrasound:  Right breast at 12-1 o'clock, 1 cm  from the nipple there is a 0.7 x 0.5 x 0.7 cm vascular intraductal mass.  This correlates with the patient's history of right nipple discharge in his suspicious.  In the left breast 9:00 axis subareolar position there is a 1 x 0.5 x 0.9 cm oval mass with indistinct margins.  In the left breast 11:00 axis there is mild duct ectasia with echogenic intraductal foci compatible with calcifications and this correlates with the calcifications seen on mammogram in the 11-1 o'clock axis.  Biopsy bilateral breast masses 09/26/2024:  Site #1: Left breast 11:00-1:00 axes posterior to anterior depths segmental coarse heterogeneous calcifications ; T3-coil clip  Site #2: Right breast 1:00 axis posterior depth grouped coarse heterogeneous calcifications ; T3-coil clip  Site #3: Left breast 9:00 axis subareolar mass ; T4-butterfly clip  Site #4: Right breast 12:30 axis 1 cm FN intraductal mass ; T1-barrel clip  MRI breast 11/05/2024: BIRADS 2.   Right breast:   In the central region anterior depth, there is postsurgical change and a metallic clip (T1-barrel) related to recent ultrasound-guided core needle biopsy of an intraductal papilloma.  There is no enhancement unique to the BPE at or around this core needle biopsy site.  In the 1:00 axis posterior depth, there is postsurgical change and a metallic clip (T3-coil) related to recent benign stereotactic guided core needle biopsy of calcifications.  There is no enhancement unique to the BPE at or around this core needle biopsy site.   Left breast:  In the 12:00 axis middle depth, there is postsurgical change and a metallic clip (T3-coil) related to recent benign stereotactic guided core needle biopsy of calcifications.  There is no enhancement unique to the BPE at or around this core needle biopsy site.  In the 9:00 axis central region middle depth, there is postsurgical change and a metallic clip (T4-butterfly) related to recent high-risk benign ultrasound-guided core needle  biopsy of an atypical papilloma.  There is no enhancement unique to the BPE at or around this core needle biopsy site.    I have reviewed the imaging and agree with the radiologists interpretation. I have discussed these results with the patient.    Pathology:   CORE BREAST BIOPSIES 9/26/24:  Site #1: Left breast 11:00-1:00 axes posterior to anterior depths calcifications ; T3-coil clip:  Florid ductal hyperplasia of the usual type, focal.  Mammary duct ectasia.  Fibroadenomatoid change with microcalcifications.  Papillary lesion, fragmented and minimally represented, not further classified.  Site #2: Right breast 1:00 axis posterior depth calcifications ; T3-coil clip:  Sclerosing adenosis with the associated abundant microcalcifications, no evidence of malignancy.  Columnar cell changes.  Fibroadenoma.  Left breast, subareolar mass at 9:00 core biopsy:  Intraductal papilloma with superimposed florid epithelial hyperplasia, atypical ductal hyperplasia and apocrine metaplasia, atypical papilloma.  Right breast mass at 12:30, 1 cm from nipple core biopsy:  Intraductal papilloma with superimposed florid epithelial hyperplasia of the usual type and apocrine metaplasia.       OB / GYN History   Menarche Onset:12  Menopause: Menopause: perimenopausal  Hormonal birth control (duration): 0years  Pregnancies: 2  Age at first child birth: 33  Child births: 2  Hysterectomy/Oophorectomy: no  HRT: no    Family History of Cancer (& age at diagnosis):  -   Family History   Problem Relation Name Age of Onset    Liver cancer Father Earle Gutiérrez 72    Cancer Father Earle Gutiérrez     Asthma Brother Braulio Gutiérrez     Breast cancer Neg Hx          Lifestyle:  Height and Weight:   BMI: Body mass index is 19.92 kg/m².     Other:  MG breast density: C  Prior thoracic RT: none  Genetic testing: No  Ashkenazi Confucianism descent: No    Other History:     Past Medical History:   Diagnosis Date    Cystitis         Past Surgical  History:   Procedure Laterality Date    no past surgery          Social History     Socioeconomic History    Marital status:    Tobacco Use    Smoking status: Never     Passive exposure: Never    Smokeless tobacco: Never   Substance and Sexual Activity    Alcohol use: Never    Drug use: Never    Sexual activity: Not Currently     Partners: Male     Birth control/protection: None     Social Drivers of Health     Financial Resource Strain: Low Risk  (4/24/2024)    Overall Financial Resource Strain (CARDIA)     Difficulty of Paying Living Expenses: Not hard at all   Food Insecurity: No Food Insecurity (4/24/2024)    Hunger Vital Sign     Worried About Running Out of Food in the Last Year: Never true     Ran Out of Food in the Last Year: Never true   Transportation Needs: No Transportation Needs (4/24/2024)    PRAPARE - Transportation     Lack of Transportation (Medical): No     Lack of Transportation (Non-Medical): No   Physical Activity: Sufficiently Active (4/24/2024)    Exercise Vital Sign     Days of Exercise per Week: 7 days     Minutes of Exercise per Session: 60 min   Stress: No Stress Concern Present (4/24/2024)    [place] of Occupational Health - Occupational Stress Questionnaire     Feeling of Stress : Not at all   Housing Stability: Low Risk  (4/24/2024)    Housing Stability Vital Sign     Unable to Pay for Housing in the Last Year: No     Homeless in the Last Year: No        Immunization History   Administered Date(s) Administered    COVID-19, MRNA, LN-S, PF (Pfizer) (Purple Cap) 09/10/2021, 10/01/2021    Influenza - Quadrivalent - PF *Preferred* (6 months and older) 11/29/2022, 11/16/2023    Influenza - Trivalent - Fluarix, Flulaval, Fluzone, Afluria - PF 11/15/2024        Medications/Allergies:       Current Outpatient Medications   Medication Instructions    ascorbic acid (VITAMIN C ORAL) Take by mouth.    doxepin (SILENOR) 6 mg, Oral, Nightly    zolpidem (AMBIEN) 5 mg, Oral, Nightly  PRN       Review of patient's allergies indicates:   Allergen Reactions    Ibuprofen Swelling        Review of Systems:      ROS  See HPI for pertinent ROS        Objective/Physical Exam   Visit Vitals  /75 (BP Location: Left arm, Patient Position: Sitting)   Pulse 71   Temp 97.6 °F (36.4 °C) (Oral)   Resp 18   Ht 5' (1.524 m)   Wt 46.3 kg (102 lb)   LMP 08/01/2024 (Approximate)   SpO2 100%   BMI 19.92 kg/m²          Physical Exam    General: The patient is awake, alert and oriented. The patient is well nourished. No acute distress.  Heart: Regular rate and rhythm, no murmurs.   Lung: No increased work of breathing. Clear breath sounds bilaterally.  Lymph nodes: There is no axillary, supraclavicular or cervical lymphadenopathy.  Breast: not examined again today      Assessment and Plan     1. Intraductal papilloma with atypical ductal hyperplasia of breast    2. Preop examination      Dante rBo is a 54 y.o.female who presents for preop for BL excisional biopsy. She has bilateral breast papillomas with ADH in the left papilloma.  MRI did not show any additional suspicious findings.   L breast papilloma is located at 9, SA measuring 1cm and the R papilloma is at 1230, 1CFN, measuring 0.7cm.    Risks and benefits of bilateral breast excisional biopsy were discussed with her which include pain, infection, bleeding, scarring, and potential need to additional surgery.  Seed localization has already been scheduled.  Consent was signed and all questions answered.      Elise Fry MD  Breast Surgical Oncology       I spent a total of 15 minutes on the day of the visit.  This includes face to face time and non-face to face time preparing to see the patient (eg, review of tests), obtaining and/or reviewing separately obtained history, documenting clinical information in the electronic or other health record, independently interpreting results and communicating results to the patient/family/caregiver, or care  coordinator.

## 2024-12-02 ENCOUNTER — HOSPITAL ENCOUNTER (OUTPATIENT)
Dept: RADIOLOGY | Facility: HOSPITAL | Age: 54
Discharge: HOME | End: 2024-12-02
Attending: STUDENT IN AN ORGANIZED HEALTH CARE EDUCATION/TRAINING PROGRAM
Payer: COMMERCIAL

## 2024-12-02 DIAGNOSIS — D24.2 INTRADUCTAL PAPILLOMA OF LEFT BREAST: ICD-10-CM

## 2024-12-02 DIAGNOSIS — D24.1 INTRADUCTAL PAPILLOMA OF RIGHT BREAST: ICD-10-CM

## 2024-12-02 PROCEDURE — 77062 BREAST TOMOSYNTHESIS BI: CPT | Mod: TC

## 2024-12-02 PROCEDURE — 77062 BREAST TOMOSYNTHESIS BI: CPT | Mod: 26,,, | Performed by: STUDENT IN AN ORGANIZED HEALTH CARE EDUCATION/TRAINING PROGRAM

## 2024-12-02 PROCEDURE — 19286 PERQ DEV BREAST ADD US IMAG: CPT

## 2024-12-02 PROCEDURE — 77066 DX MAMMO INCL CAD BI: CPT | Mod: 26,,, | Performed by: STUDENT IN AN ORGANIZED HEALTH CARE EDUCATION/TRAINING PROGRAM

## 2024-12-02 PROCEDURE — 19285 PERQ DEV BREAST 1ST US IMAG: CPT | Mod: RT

## 2024-12-02 PROCEDURE — 19285 PERQ DEV BREAST 1ST US IMAG: CPT | Mod: RT,,, | Performed by: STUDENT IN AN ORGANIZED HEALTH CARE EDUCATION/TRAINING PROGRAM

## 2024-12-06 DIAGNOSIS — D24.9 INTRADUCTAL PAPILLOMA WITH ATYPICAL DUCTAL HYPERPLASIA OF BREAST: Primary | ICD-10-CM

## 2024-12-06 DIAGNOSIS — N60.99 INTRADUCTAL PAPILLOMA WITH ATYPICAL DUCTAL HYPERPLASIA OF BREAST: Primary | ICD-10-CM

## 2024-12-06 DIAGNOSIS — N60.92 ATYPICAL DUCTAL HYPERPLASIA OF LEFT BREAST: ICD-10-CM

## 2024-12-06 RX ORDER — CEFAZOLIN SODIUM 2 G/50ML
2 SOLUTION INTRAVENOUS
OUTPATIENT
Start: 2024-12-06

## 2024-12-06 RX ORDER — SODIUM CHLORIDE, SODIUM LACTATE, POTASSIUM CHLORIDE, CALCIUM CHLORIDE 600; 310; 30; 20 MG/100ML; MG/100ML; MG/100ML; MG/100ML
INJECTION, SOLUTION INTRAVENOUS CONTINUOUS
OUTPATIENT
Start: 2024-12-06

## 2024-12-09 ENCOUNTER — LAB VISIT (OUTPATIENT)
Dept: LAB | Facility: HOSPITAL | Age: 54
End: 2024-12-09
Attending: STUDENT IN AN ORGANIZED HEALTH CARE EDUCATION/TRAINING PROGRAM
Payer: COMMERCIAL

## 2024-12-09 DIAGNOSIS — D24.9 INTRADUCTAL PAPILLOMA WITH ATYPICAL DUCTAL HYPERPLASIA OF BREAST: ICD-10-CM

## 2024-12-09 DIAGNOSIS — N60.99 INTRADUCTAL PAPILLOMA WITH ATYPICAL DUCTAL HYPERPLASIA OF BREAST: ICD-10-CM

## 2024-12-09 LAB
ANION GAP SERPL CALC-SCNC: 4 MEQ/L
BASOPHILS # BLD AUTO: 0.12 X10(3)/MCL
BASOPHILS NFR BLD AUTO: 1.9 %
BUN SERPL-MCNC: 14.7 MG/DL (ref 9.8–20.1)
CALCIUM SERPL-MCNC: 9 MG/DL (ref 8.4–10.2)
CHLORIDE SERPL-SCNC: 106 MMOL/L (ref 98–107)
CO2 SERPL-SCNC: 29 MMOL/L (ref 22–29)
CREAT SERPL-MCNC: 0.85 MG/DL (ref 0.55–1.02)
CREAT/UREA NIT SERPL: 17
EOSINOPHIL # BLD AUTO: 0.81 X10(3)/MCL (ref 0–0.9)
EOSINOPHIL NFR BLD AUTO: 12.6 %
ERYTHROCYTE [DISTWIDTH] IN BLOOD BY AUTOMATED COUNT: 12.6 % (ref 11.5–17)
GFR SERPLBLD CREATININE-BSD FMLA CKD-EPI: >60 ML/MIN/1.73/M2
GLUCOSE SERPL-MCNC: 72 MG/DL (ref 74–100)
HCT VFR BLD AUTO: 42.2 % (ref 37–47)
HGB BLD-MCNC: 13.7 G/DL (ref 12–16)
IMM GRANULOCYTES # BLD AUTO: 0.01 X10(3)/MCL (ref 0–0.04)
IMM GRANULOCYTES NFR BLD AUTO: 0.2 %
LYMPHOCYTES # BLD AUTO: 2.04 X10(3)/MCL (ref 0.6–4.6)
LYMPHOCYTES NFR BLD AUTO: 31.7 %
MCH RBC QN AUTO: 29.3 PG (ref 27–31)
MCHC RBC AUTO-ENTMCNC: 32.5 G/DL (ref 33–36)
MCV RBC AUTO: 90.4 FL (ref 80–94)
MONOCYTES # BLD AUTO: 0.47 X10(3)/MCL (ref 0.1–1.3)
MONOCYTES NFR BLD AUTO: 7.3 %
NEUTROPHILS # BLD AUTO: 2.99 X10(3)/MCL (ref 2.1–9.2)
NEUTROPHILS NFR BLD AUTO: 46.3 %
NRBC BLD AUTO-RTO: 0 %
OHS QRS DURATION: 76 MS
OHS QTC CALCULATION: 406 MS
PLATELET # BLD AUTO: 243 X10(3)/MCL (ref 130–400)
PMV BLD AUTO: 9.9 FL (ref 7.4–10.4)
POTASSIUM SERPL-SCNC: 4.2 MMOL/L (ref 3.5–5.1)
RBC # BLD AUTO: 4.67 X10(6)/MCL (ref 4.2–5.4)
SODIUM SERPL-SCNC: 139 MMOL/L (ref 136–145)
WBC # BLD AUTO: 6.44 X10(3)/MCL (ref 4.5–11.5)

## 2024-12-09 PROCEDURE — 93010 ELECTROCARDIOGRAM REPORT: CPT | Mod: ,,, | Performed by: STUDENT IN AN ORGANIZED HEALTH CARE EDUCATION/TRAINING PROGRAM

## 2024-12-09 PROCEDURE — 93005 ELECTROCARDIOGRAM TRACING: CPT

## 2024-12-09 PROCEDURE — 36415 COLL VENOUS BLD VENIPUNCTURE: CPT

## 2024-12-10 ENCOUNTER — ANESTHESIA (OUTPATIENT)
Dept: SURGERY | Facility: HOSPITAL | Age: 54
End: 2024-12-10
Payer: COMMERCIAL

## 2024-12-10 ENCOUNTER — HOSPITAL ENCOUNTER (OUTPATIENT)
Facility: HOSPITAL | Age: 54
Discharge: HOME | End: 2024-12-10
Attending: STUDENT IN AN ORGANIZED HEALTH CARE EDUCATION/TRAINING PROGRAM | Admitting: STUDENT IN AN ORGANIZED HEALTH CARE EDUCATION/TRAINING PROGRAM
Payer: COMMERCIAL

## 2024-12-10 ENCOUNTER — ANESTHESIA EVENT (OUTPATIENT)
Dept: SURGERY | Facility: HOSPITAL | Age: 54
End: 2024-12-10
Payer: COMMERCIAL

## 2024-12-10 ENCOUNTER — HOSPITAL ENCOUNTER (OUTPATIENT)
Dept: RADIOLOGY | Facility: HOSPITAL | Age: 54
Discharge: HOME | End: 2024-12-10
Attending: STUDENT IN AN ORGANIZED HEALTH CARE EDUCATION/TRAINING PROGRAM | Admitting: STUDENT IN AN ORGANIZED HEALTH CARE EDUCATION/TRAINING PROGRAM
Payer: COMMERCIAL

## 2024-12-10 DIAGNOSIS — R92.8 ABNORMAL MAMMOGRAM: ICD-10-CM

## 2024-12-10 DIAGNOSIS — N60.99 INTRADUCTAL PAPILLOMA WITH ATYPICAL DUCTAL HYPERPLASIA OF BREAST: Primary | ICD-10-CM

## 2024-12-10 DIAGNOSIS — N60.92 ATYPICAL DUCTAL HYPERPLASIA OF LEFT BREAST: ICD-10-CM

## 2024-12-10 DIAGNOSIS — D24.9 INTRADUCTAL PAPILLOMA WITH ATYPICAL DUCTAL HYPERPLASIA OF BREAST: Primary | ICD-10-CM

## 2024-12-10 LAB
B-HCG UR QL: NEGATIVE
CTP QC/QA: YES

## 2024-12-10 PROCEDURE — 76098 X-RAY EXAM SURGICAL SPECIMEN: CPT | Mod: TC

## 2024-12-10 PROCEDURE — 37000009 HC ANESTHESIA EA ADD 15 MINS: Performed by: STUDENT IN AN ORGANIZED HEALTH CARE EDUCATION/TRAINING PROGRAM

## 2024-12-10 PROCEDURE — 76098 X-RAY EXAM SURGICAL SPECIMEN: CPT | Mod: 26,,, | Performed by: STUDENT IN AN ORGANIZED HEALTH CARE EDUCATION/TRAINING PROGRAM

## 2024-12-10 PROCEDURE — 63600175 PHARM REV CODE 636 W HCPCS: Performed by: STUDENT IN AN ORGANIZED HEALTH CARE EDUCATION/TRAINING PROGRAM

## 2024-12-10 PROCEDURE — 63600175 PHARM REV CODE 636 W HCPCS: Performed by: ANESTHESIOLOGY

## 2024-12-10 PROCEDURE — 37000008 HC ANESTHESIA 1ST 15 MINUTES: Performed by: STUDENT IN AN ORGANIZED HEALTH CARE EDUCATION/TRAINING PROGRAM

## 2024-12-10 PROCEDURE — 63600175 PHARM REV CODE 636 W HCPCS: Performed by: NURSE ANESTHETIST, CERTIFIED REGISTERED

## 2024-12-10 PROCEDURE — 36000707: Performed by: STUDENT IN AN ORGANIZED HEALTH CARE EDUCATION/TRAINING PROGRAM

## 2024-12-10 PROCEDURE — 25000003 PHARM REV CODE 250: Performed by: ANESTHESIOLOGY

## 2024-12-10 PROCEDURE — 25000003 PHARM REV CODE 250: Performed by: NURSE ANESTHETIST, CERTIFIED REGISTERED

## 2024-12-10 PROCEDURE — 81025 URINE PREGNANCY TEST: CPT | Performed by: ANESTHESIOLOGY

## 2024-12-10 PROCEDURE — 36000706: Performed by: STUDENT IN AN ORGANIZED HEALTH CARE EDUCATION/TRAINING PROGRAM

## 2024-12-10 PROCEDURE — 19125 EXCISION BREAST LESION: CPT | Mod: 50,,, | Performed by: STUDENT IN AN ORGANIZED HEALTH CARE EDUCATION/TRAINING PROGRAM

## 2024-12-10 PROCEDURE — 71000015 HC POSTOP RECOV 1ST HR: Performed by: STUDENT IN AN ORGANIZED HEALTH CARE EDUCATION/TRAINING PROGRAM

## 2024-12-10 PROCEDURE — 71000033 HC RECOVERY, INTIAL HOUR: Performed by: STUDENT IN AN ORGANIZED HEALTH CARE EDUCATION/TRAINING PROGRAM

## 2024-12-10 PROCEDURE — 71000016 HC POSTOP RECOV ADDL HR: Performed by: STUDENT IN AN ORGANIZED HEALTH CARE EDUCATION/TRAINING PROGRAM

## 2024-12-10 RX ORDER — BUPIVACAINE HYDROCHLORIDE 5 MG/ML
INJECTION, SOLUTION EPIDURAL; INTRACAUDAL
Status: DISCONTINUED | OUTPATIENT
Start: 2024-12-10 | End: 2024-12-10 | Stop reason: HOSPADM

## 2024-12-10 RX ORDER — TRAMADOL HYDROCHLORIDE 50 MG/1
50 TABLET ORAL EVERY 6 HOURS
Qty: 12 TABLET | Refills: 0 | Status: SHIPPED | OUTPATIENT
Start: 2024-12-10 | End: 2024-12-13

## 2024-12-10 RX ORDER — PROPOFOL 10 MG/ML
VIAL (ML) INTRAVENOUS
Status: DISCONTINUED | OUTPATIENT
Start: 2024-12-10 | End: 2024-12-10

## 2024-12-10 RX ORDER — HYDROMORPHONE HYDROCHLORIDE 2 MG/ML
0.2 INJECTION, SOLUTION INTRAMUSCULAR; INTRAVENOUS; SUBCUTANEOUS EVERY 5 MIN PRN
Status: DISCONTINUED | OUTPATIENT
Start: 2024-12-10 | End: 2024-12-10

## 2024-12-10 RX ORDER — METOCLOPRAMIDE HYDROCHLORIDE 5 MG/ML
10 INJECTION INTRAMUSCULAR; INTRAVENOUS EVERY 10 MIN PRN
Status: DISCONTINUED | OUTPATIENT
Start: 2024-12-10 | End: 2024-12-10 | Stop reason: HOSPADM

## 2024-12-10 RX ORDER — BUPIVACAINE HYDROCHLORIDE 5 MG/ML
INJECTION, SOLUTION EPIDURAL; INTRACAUDAL
Status: DISCONTINUED
Start: 2024-12-10 | End: 2024-12-10 | Stop reason: HOSPADM

## 2024-12-10 RX ORDER — ONDANSETRON HYDROCHLORIDE 2 MG/ML
4 INJECTION, SOLUTION INTRAVENOUS EVERY 12 HOURS PRN
Status: DISCONTINUED | OUTPATIENT
Start: 2024-12-10 | End: 2024-12-10 | Stop reason: HOSPADM

## 2024-12-10 RX ORDER — ACETAMINOPHEN 500 MG
1000 TABLET ORAL
Status: COMPLETED | OUTPATIENT
Start: 2024-12-10 | End: 2024-12-10

## 2024-12-10 RX ORDER — TRAMADOL HYDROCHLORIDE 50 MG/1
50 TABLET ORAL EVERY 4 HOURS PRN
Status: DISCONTINUED | OUTPATIENT
Start: 2024-12-10 | End: 2024-12-10 | Stop reason: HOSPADM

## 2024-12-10 RX ORDER — SODIUM CHLORIDE, SODIUM LACTATE, POTASSIUM CHLORIDE, CALCIUM CHLORIDE 600; 310; 30; 20 MG/100ML; MG/100ML; MG/100ML; MG/100ML
INJECTION, SOLUTION INTRAVENOUS CONTINUOUS
Status: DISCONTINUED | OUTPATIENT
Start: 2024-12-10 | End: 2024-12-10 | Stop reason: HOSPADM

## 2024-12-10 RX ORDER — HYDROMORPHONE HYDROCHLORIDE 2 MG/ML
0.4 INJECTION, SOLUTION INTRAMUSCULAR; INTRAVENOUS; SUBCUTANEOUS EVERY 5 MIN PRN
Status: DISCONTINUED | OUTPATIENT
Start: 2024-12-10 | End: 2024-12-10 | Stop reason: HOSPADM

## 2024-12-10 RX ORDER — GLUCAGON 1 MG
1 KIT INJECTION
Status: DISCONTINUED | OUTPATIENT
Start: 2024-12-10 | End: 2024-12-10 | Stop reason: HOSPADM

## 2024-12-10 RX ORDER — FLUMAZENIL 0.1 MG/ML
INJECTION INTRAVENOUS
Status: DISCONTINUED | OUTPATIENT
Start: 2024-12-10 | End: 2024-12-10

## 2024-12-10 RX ORDER — CEFAZOLIN SODIUM 1 G/3ML
INJECTION, POWDER, FOR SOLUTION INTRAMUSCULAR; INTRAVENOUS
Status: DISCONTINUED | OUTPATIENT
Start: 2024-12-10 | End: 2024-12-10 | Stop reason: HOSPADM

## 2024-12-10 RX ORDER — ONDANSETRON 4 MG/1
4 TABLET, ORALLY DISINTEGRATING ORAL ONCE
Status: COMPLETED | OUTPATIENT
Start: 2024-12-10 | End: 2024-12-10

## 2024-12-10 RX ORDER — ONDANSETRON HYDROCHLORIDE 2 MG/ML
4 INJECTION, SOLUTION INTRAVENOUS DAILY PRN
Status: DISCONTINUED | OUTPATIENT
Start: 2024-12-10 | End: 2024-12-10 | Stop reason: HOSPADM

## 2024-12-10 RX ORDER — MIDAZOLAM HYDROCHLORIDE 2 MG/2ML
2 INJECTION, SOLUTION INTRAMUSCULAR; INTRAVENOUS ONCE AS NEEDED
Status: COMPLETED | OUTPATIENT
Start: 2024-12-10 | End: 2024-12-10

## 2024-12-10 RX ORDER — ROCURONIUM BROMIDE 10 MG/ML
INJECTION, SOLUTION INTRAVENOUS
Status: DISCONTINUED | OUTPATIENT
Start: 2024-12-10 | End: 2024-12-10

## 2024-12-10 RX ORDER — CEFAZOLIN SODIUM 1 G/3ML
INJECTION, POWDER, FOR SOLUTION INTRAMUSCULAR; INTRAVENOUS
Status: DISCONTINUED
Start: 2024-12-10 | End: 2024-12-10 | Stop reason: HOSPADM

## 2024-12-10 RX ORDER — SODIUM CHLORIDE, SODIUM GLUCONATE, SODIUM ACETATE, POTASSIUM CHLORIDE AND MAGNESIUM CHLORIDE 30; 37; 368; 526; 502 MG/100ML; MG/100ML; MG/100ML; MG/100ML; MG/100ML
INJECTION, SOLUTION INTRAVENOUS CONTINUOUS
Status: DISCONTINUED | OUTPATIENT
Start: 2024-12-10 | End: 2024-12-10 | Stop reason: HOSPADM

## 2024-12-10 RX ORDER — CEFAZOLIN 2 G/1
2 INJECTION, POWDER, FOR SOLUTION INTRAMUSCULAR; INTRAVENOUS ONCE
Status: DISCONTINUED | OUTPATIENT
Start: 2024-12-10 | End: 2024-12-10 | Stop reason: HOSPADM

## 2024-12-10 RX ORDER — LIDOCAINE HYDROCHLORIDE 10 MG/ML
1 INJECTION, SOLUTION EPIDURAL; INFILTRATION; INTRACAUDAL; PERINEURAL ONCE
Status: DISCONTINUED | OUTPATIENT
Start: 2024-12-10 | End: 2024-12-10 | Stop reason: HOSPADM

## 2024-12-10 RX ORDER — GABAPENTIN 300 MG/1
300 CAPSULE ORAL
Status: COMPLETED | OUTPATIENT
Start: 2024-12-10 | End: 2024-12-10

## 2024-12-10 RX ORDER — DIPHENHYDRAMINE HYDROCHLORIDE 50 MG/ML
25 INJECTION INTRAMUSCULAR; INTRAVENOUS EVERY 6 HOURS PRN
Status: DISCONTINUED | OUTPATIENT
Start: 2024-12-10 | End: 2024-12-10 | Stop reason: HOSPADM

## 2024-12-10 RX ORDER — FENTANYL CITRATE 50 UG/ML
INJECTION, SOLUTION INTRAMUSCULAR; INTRAVENOUS
Status: DISCONTINUED | OUTPATIENT
Start: 2024-12-10 | End: 2024-12-10

## 2024-12-10 RX ADMIN — ACETAMINOPHEN 1000 MG: 500 TABLET ORAL at 11:12

## 2024-12-10 RX ADMIN — ROCURONIUM BROMIDE 40 MG: 10 INJECTION, SOLUTION INTRAVENOUS at 11:12

## 2024-12-10 RX ADMIN — FENTANYL CITRATE 50 MCG: 50 INJECTION, SOLUTION INTRAMUSCULAR; INTRAVENOUS at 11:12

## 2024-12-10 RX ADMIN — ONDANSETRON 4 MG: 4 TABLET, ORALLY DISINTEGRATING ORAL at 11:12

## 2024-12-10 RX ADMIN — GABAPENTIN 300 MG: 300 CAPSULE ORAL at 11:12

## 2024-12-10 RX ADMIN — FLUMAZENIL 0.2 MG: 0.1 INJECTION, SOLUTION INTRAVENOUS at 01:12

## 2024-12-10 RX ADMIN — MIDAZOLAM HYDROCHLORIDE 2 MG: 1 INJECTION, SOLUTION INTRAMUSCULAR; INTRAVENOUS at 11:12

## 2024-12-10 RX ADMIN — PROPOFOL 125 MG: 10 INJECTION, EMULSION INTRAVENOUS at 11:12

## 2024-12-10 RX ADMIN — SUGAMMADEX 175 MG: 100 INJECTION, SOLUTION INTRAVENOUS at 01:12

## 2024-12-10 RX ADMIN — FENTANYL CITRATE 50 MCG: 50 INJECTION, SOLUTION INTRAMUSCULAR; INTRAVENOUS at 12:12

## 2024-12-10 RX ADMIN — SODIUM CHLORIDE: 9 INJECTION, SOLUTION INTRAVENOUS at 11:12

## 2024-12-10 NOTE — ANESTHESIA PROCEDURE NOTES
Intubation    Date/Time: 12/10/2024 11:49 AM    Performed by: Shashank Griffiths CRNA  Authorized by: Santo Shaw MD    Intubation:     Induction:  Intravenous    Intubated:  Postinduction    Mask Ventilation:  Easy mask    Attempts:  1    Attempted By:  CRNA    Method of Intubation:  Direct    Blade:  Ken 2    Laryngeal View Grade: Grade I - full view of cords      Difficult Airway Encountered?: No      Complications:  None    Airway Device Size:  6.5    Style/Cuff Inflation:  Cuffed    Tube secured:  21    Placement Verified By:  Capnometry    Complicating Factors:  None    Findings Post-Intubation:  BS equal bilateral

## 2024-12-10 NOTE — DISCHARGE INSTRUCTIONS
Post-operative Instructions    o Do not drive, operate machinery, or any hazardous activity for at least 24 hours following  anesthesia or sedation and while taking narcotics. You may drive only once you are pain-  free and moving quickly and freely and without hesitation or discomfort.    o Do not drink alcohol for at least 24 hours and while taking narcotics    o Do not sign important papers or make important business decisions for 24 hours following  your surgery    o Do not stay alone for 24 hours following surgery    Care at Home:    o Advance diet as tolerated    o Follow exercises on information sheet as tolerated    o You may shower on postoperative day #1. Remove outer dressings and shower as you  normally would. If you have one or more drainage catheters, see below. Pat dry and apply  clean pads as needed after showering. Try to avoid using tape. If you have steri-strips,  leave them on until they fall off, or remove gently in 7-10 days. Do not soak in a tub or go  swimming for 2 weeks post surgery.    o No lifting more than 10 lbs or strenuous physical activity prior to post-operative appointment    o Move arms/ shoulders freely but no overhead lifting until instructed so at post-op or with  physical therapy    o Wear your bra or ace wrap for 5-7 days - may be removed to wash and change clothes.    Pain Management    o Take acetaminophen (Tylenol) or ibuprofen (Motrin and others) or naproxen (Aleve) as  needed for pain. Follow the instructions on the bottle regarding dosage and daily limits.  You may alternate acetaminophen and either ibuprofen or naproxen if needed. Do not  take more than 3250 mg of acetaminophen (10 regular strength or 6 extra strength  tablets) in any 24 hour period.    o If you were provided a prescription for a narcotic you may take that instead of the  acetaminophen. If the narcotic contains acetaminophen (such as hydrocodone/APAP,  Norco, Vicodin or Percocet) be aware of the total  daily limit on acetaminophen.    o If you were not provided a prescription for a narcotic and feel that your pain is not  adequately controlled, call the Breast Center and a prescription can be sent to your  Pharmacy    o Please do not take more than 8 oxycodone or hydrocodone tablets per day and not take  additional Tylenol (acetaminophen) while on Percocet, Norco, Lortab or other medication  containing Tylenol.    o Narcotic pain medication can cause constipation. Please use over the counter  medication (Senakot S, Milk of Mag, Prune Juice, Bri-Colace, etc.) to prevent and  alleviate constipation post operatively.    o Do not take any aspirin until postoperative day #2..    o If you do get constipated, remember that Colace and other stool softeners are NOT  laxatives. If you have not had a bowel movement in a few days or become bloated you  will need to use something stronger. You may try prune juice or a mild laxative that has  worked for you in the past, but you will very likely require something stronger. A daily  dose of Miralax works very well for most patients. The most effective and quickest  acting solution is usually a suppository or enema. If you are constipated, you should  consider sending someone to the pharmacy for an over the counter suppository  (Dulcolax or similar products) and an over the counter enema (Fleets enema or other  products). Try one and then the other if needed. If nothing provides relief, contact the  Breast Center.    Call the Breast Center [phone] for the following:  o Temperature greater than 101.4 degrees F.  o For uncontrolled pain  o Excessive bleeding, swelling, or pain at surgical site  o Persistent nausea or vomiting  o Foul smelling discharge from surgical wounds

## 2024-12-10 NOTE — INTERVAL H&P NOTE
The patient has been examined and the H&P has been reviewed:    I concur with the findings and no changes have occurred since H&P was written.    Surgery risks, benefits and alternative options discussed and understood by patient/family.    Elise Fry MD      There are no hospital problems to display for this patient.

## 2024-12-10 NOTE — ANESTHESIA PREPROCEDURE EVALUATION
12/10/2024  Dante Bro is a 54 y.o., female.  Diagnosis: Intraductal papilloma with atypical ductal hyperplasia of breast [D24.9, N60.99]   Pre-op diagnosis: Intraductal papilloma with atypical ductal hyperplasia of breast [D24.9, N60.99]     The pt.comes to [facility] for the noted procedure under GA (GA/LMA vs GETA).  Case: [id number] Date/Time: 12/10/24 1205   Procedure: EXCISION,MASS,BREAST,USING RADIOLOGICAL MARKER - Magseed Localization // Bilateral need ultrasound, need faxitron, need sentimag (Bilateral) - need ultrasound, need faxitron, need sentimag   Anesthesia type: General     PMHx:  Other Medical History   Cystitis Breast mass   benign         PSHx:  None      Vital signs:  Height: 5' (1.524 m) (12/09/24) Weight: 47.2 kg (104 lb) (12/09/24)   BMI: 20.3 IBW: 45.5 kg (100 lb 4.9 oz)         Lab Data:  Urine Pregnancy test:   Negative        EKG:      Chest X-Ray:      Pre-op Assessment    I have reviewed the Patient Summary Reports.     I have reviewed the Nursing Notes. I have reviewed the NPO Status.   I have reviewed the Medications.     Review of Systems  Anesthesia Hx:  No problems with previous Anesthesia                Social:  Non-Smoker       Hematology/Oncology:  Hematology Normal   Oncology Normal                                   EENT/Dental:  EENT/Dental Normal           Cardiovascular:  Cardiovascular Normal Exercise tolerance: good                     Functional Capacity good / => 4 METS                         Pulmonary:  Pulmonary Normal                       Renal/:  Renal/ Normal                 Hepatic/GI:  Hepatic/GI Normal                    Musculoskeletal:  Musculoskeletal Normal                Neurological:  Neurology Normal                                      Endocrine:  Endocrine Normal            Dermatological:  Skin Normal    Psych:  Psychiatric Normal                     Physical Exam  General: Alert, Oriented, Well nourished and Cooperative    Airway:  Mallampati: II   Mouth Opening: Normal  TM Distance: Normal  Tongue: Normal  Neck ROM: Normal ROM    Dental:  Intact    Chest/Lungs:  Clear to auscultation, Normal Respiratory Rate    Heart:  Rate: Normal  Rhythm: Regular Rhythm        Anesthesia Plan  Type of Anesthesia, risks & benefits discussed:    Anesthesia Type: Gen Supraglottic Airway  Intra-op Monitoring Plan: Standard ASA Monitors  Post Op Pain Control Plan: multimodal analgesia and IV/PO Opioids PRN  Induction:  IV  Airway Plan: Direct  Informed Consent: Informed consent signed with the Patient and all parties understand the risks and agree with anesthesia plan.  All questions answered. Patient consented to blood products? Yes  ASA Score: 2  Day of Surgery Review of History & Physical: H&P Update referred to the surgeon/provider.    Ready For Surgery From Anesthesia Perspective.     .

## 2024-12-10 NOTE — BRIEF OP NOTE
[facility] Surgical - Periop Services  Brief Operative Note    Surgery Date: 12/10/2024     Surgeons and Role:     * Elise Fry MD - Primary    Assisting Surgeon: Elisabeth López PA-C    Pre-op Diagnosis:  Intraductal papilloma with atypical ductal hyperplasia of breast [D24.9, N60.99]    Post-op Diagnosis:  Post-Op Diagnosis Codes:     * Intraductal papilloma with atypical ductal hyperplasia of breast [D24.9, N60.99]    Procedure(s) (LRB):  EXCISION,MASS,BREAST,USING RADIOLOGICAL MARKER - Magseed Localization // Bilateral need ultrasound, need faxitron, need sentimag (Bilateral)    Anesthesia: General    Operative Findings: breast tissue    Estimated Blood Loss: 20cc         Specimens:   ID Type Source Tests Collected by Time Destination   A : RIGHT BREAST EXCISIONAL BIOPSY (SHORT SUPERIOR, LONG LATERAL, INK ANTERIOR Tissue Breast, Right SPECIMEN TO PATHOLOGY Elise Fry MD 12/10/2024 1216    B : LEFT BREAST EXCISIONAL BIOPSY (SHORT SUPERIOR, LONG LATERAL, INK ANTERIOR Tissue Breast, Left SPECIMEN TO PATHOLOGY Elise Fry MD 12/10/2024 1216    C : LEFT BREAST LATERAL MARGIN (INK MARKS NEW MARGIN) Tissue Breast, Left SPECIMEN TO PATHOLOGY Elise Fry MD 12/10/2024 1252              Discharge Note    OUTCOME: Patient tolerated treatment/procedure well without complication and is now ready for discharge.    DISPOSITION: Home or Self Care    FINAL DIAGNOSIS:  <principal problem not specified>    FOLLOWUP: In clinic    DISCHARGE INSTRUCTIONS:    Discharge Procedure Orders   Diet general     Ice to affected area     Lifting restrictions     Remove dressing in 24 hours   Order Comments: Leave glue and steri strips until clinic visit     Call MD for:  temperature >100.4     Call MD for:  persistent nausea and vomiting     Call MD for:  severe uncontrolled pain     Call MD for:  difficulty breathing, headache or visual disturbances     Call MD for:  redness, tenderness, or signs of infection (pain,  swelling, redness, odor or green/yellow discharge around incision site)     Call MD for:  hives     Call MD for:  persistent dizziness or light-headedness

## 2024-12-10 NOTE — ANESTHESIA POSTPROCEDURE EVALUATION
Anesthesia Post Evaluation    Patient: Dante Bro    Procedure(s) Performed: Procedure(s) (LRB):  EXCISION,MASS,BREAST,USING RADIOLOGICAL MARKER - Magseed Localization // Bilateral need ultrasound, need faxitron, need sentimag (Bilateral)    Final Anesthesia Type: general      Patient location during evaluation: PACU  Patient participation: No - Unable to Participate, Sedation  Level of consciousness: sedated  Post-procedure vital signs: reviewed and stable  Pain management: adequate  Airway patency: patent  RODRIGUEZ mitigation strategies: Multimodal analgesia  PONV status at discharge: No PONV  Anesthetic complications: no      Cardiovascular status: stable  Respiratory status: nasal cannula  Hydration status: euvolemic  Follow-up not needed.              Vitals Value Taken Time   /76 12/10/24 1047   Temp 36.8 °C (98.2 °F) 12/10/24 1047   Pulse 63 12/10/24 1047   Resp 18 12/10/24 1320   SpO2 100 % 12/10/24 1047         No case tracking events are documented in the log.      Pain/Marge Score: Pain Rating Prior to Med Admin: 0 (12/10/2024 11:15 AM)

## 2024-12-11 VITALS
RESPIRATION RATE: 19 BRPM | SYSTOLIC BLOOD PRESSURE: 120 MMHG | OXYGEN SATURATION: 99 % | HEART RATE: 60 BPM | TEMPERATURE: 98 F | BODY MASS INDEX: 19.01 KG/M2 | HEIGHT: 60 IN | WEIGHT: 96.81 LBS | DIASTOLIC BLOOD PRESSURE: 78 MMHG

## 2024-12-13 LAB — PSYCHE PATHOLOGY RESULT: NORMAL

## 2024-12-13 NOTE — OP NOTE
DATE OF PROCEDURE: 12/10/2024    SURGEON: Elise Fry MD    ASSISTANT:  Elisabeth López PA-C  Circulator: Brianna Bonilla RN  Physician Assistant: Elisabeth López PA-C  Scrub Person: Georgette Ward ST    PREOPERATIVE DIAGNOSIS: Intraductal papilloma with atypical ductal hyperplasia of breast [D24.9, N60.99]     POSTOPERATIVE DIAGNOSIS: Post-Op Diagnosis Codes:     * Intraductal papilloma with atypical ductal hyperplasia of breast [D24.9, N60.99]     ANESTHESIA: General Anesthesiologist: Santo Shaw MD  CRNA: Shashank Griffiths CRNA     PROCEDURES PERFORMED:   1. Bilateral breast excisional biopsy with MagSeed localization     EXCISION,MASS,BREAST,USING RADIOLOGICAL MARKER - Magseed Localization // Bilateral need ultrasound, need faxitron, need sentima (CPT®)       PROCEDURE IN DETAIL:   Dante Bro is a 54 y.o. female brought to the operating room for definitive surgical management of recent core biopsy revealing an intraductal papilloma with atypia in the L breast subareolar region and an intraductal papilloma in the right breast subareolar region.  The patient has elected to undergo bilateral excisional biopsy for further evaluation. The patient was informed of the possible risks and complications of the procedure, including but not limited to anesthetic risks, bleeding, infection, and need for additional surgery. The patient concurred with the proposed plan, and has given informed consent. The site of surgery was properly noted/marked in the preoperative holding area.    The patient underwent informed consent. The MagSeeds were placed in the subareolar region of the bilateral breast. The MagSeed localization films were reviewed.    She was then brought to the Operating Room and placed in the supine position. Anesthesia was administered. The bilateral breast, anterior chest, arm and axilla were then prepped and draped in a sterile fashion.     Attention was turned to the right breast itself. An  incision was made in the medial areolar border of the right breast over the anticipated tract of the seed. The specimen was dissected circumferentially around the seed and area of concern. The dissection was carried out circumferentially to include the seed. The specimen was completely dissected free. The seed localized specimen was marked with short stitch superior and long stitch lateral with ink anterior and submitted for specimen radiograph. Specimen radiograph confirmed the seed, clip and area of interest were within the specimen.    Within the lumpectomy cavity, hemostasis was achieved with cautery. The wound was irrigated until clear. There was no evidence of bleeding. It was closed in multiple layers with deep dermal and subcutaneous interrupted 3-0 Vicryl sutures and a running 4-0 Monocryl subcuticular skin closure. Steristrips were applied followed by sterile dressings.    Attention was then turned to the left breast itself. An incision was made in the medial areolar border of the left breast over the anticipated tract of the seed. The specimen was dissected circumferentially around the seed and area of concern. The dissection was carried out circumferentially to include the seed. The specimen was completely dissected free. The seed localized specimen was marked with short stitch superior and long stitch lateral with ink anterior and submitted for specimen radiograph. Specimen radiograph confirmed the seed, clip and area of interest were within the specimen. I took an additional lateral margin since the seed and clip were on the edge of the specimen.     Within the lumpectomy cavity, hemostasis was achieved with cautery. The wound was irrigated until clear. There was no evidence of bleeding. It was closed in multiple layers with deep dermal and subcutaneous interrupted 3-0 Vicryl sutures and a running 4-0 Monocryl subcuticular skin closure. Steristrips were applied followed by sterile dressings.    All  instruments and sponge counts were correct at the end of the procedure.       ESTIMATED BLOOD LOSS: 20cc    Implants: none    Significant Surgical Tasks Conducted by the Assistant(s), if Applicable: The skilled assistance of the Physician Assistant, Elisabeth López PA-C, was necessary for the successful completion of this case. She was essential for proper positioning of the patient, manipulation of instruments, proper exposure, manipulation of tissue, and wound closure.     Specimens:   ID Type Source Tests Collected by Time Destination   A : RIGHT BREAST EXCISIONAL BIOPSY (SHORT SUPERIOR, LONG LATERAL, INK ANTERIOR Tissue Breast, Right SPECIMEN TO PATHOLOGY Elise Fry MD 12/10/2024 1216    B : LEFT BREAST EXCISIONAL BIOPSY (SHORT SUPERIOR, LONG LATERAL, INK ANTERIOR Tissue Breast, Left SPECIMEN TO PATHOLOGY Elise Fry MD 12/10/2024 1216    C : LEFT BREAST LATERAL MARGIN (INK MARKS NEW MARGIN) Tissue Breast, Left SPECIMEN TO PATHOLOGY Elise Fry MD 12/10/2024 1252               Condition: Good    Disposition: PACU - hemodynamically stable.    Attestation: I was present and scrubbed for the entire procedure.

## 2024-12-17 ENCOUNTER — HOSPITAL ENCOUNTER (OUTPATIENT)
Dept: RADIOLOGY | Facility: HOSPITAL | Age: 54
Discharge: HOME | End: 2024-12-17
Attending: STUDENT IN AN ORGANIZED HEALTH CARE EDUCATION/TRAINING PROGRAM
Payer: COMMERCIAL

## 2024-12-17 DIAGNOSIS — R93.5 ABNORMAL ABDOMINAL CT SCAN: ICD-10-CM

## 2024-12-17 DIAGNOSIS — J98.11 ATELECTASIS: Primary | ICD-10-CM

## 2024-12-17 PROCEDURE — 71250 CT THORAX DX C-: CPT | Mod: TC

## 2024-12-18 ENCOUNTER — TELEPHONE (OUTPATIENT)
Dept: FAMILY MEDICINE | Facility: CLINIC | Age: 54
End: 2024-12-18
Payer: COMMERCIAL

## 2024-12-18 ENCOUNTER — OFFICE VISIT (OUTPATIENT)
Dept: SURGERY | Facility: CLINIC | Age: 54
End: 2024-12-18
Payer: COMMERCIAL

## 2024-12-18 VITALS
BODY MASS INDEX: 18.62 KG/M2 | TEMPERATURE: 98 F | HEIGHT: 61 IN | SYSTOLIC BLOOD PRESSURE: 123 MMHG | WEIGHT: 98.63 LBS | DIASTOLIC BLOOD PRESSURE: 82 MMHG | HEART RATE: 60 BPM | RESPIRATION RATE: 16 BRPM

## 2024-12-18 DIAGNOSIS — N60.92 ATYPICAL DUCTAL HYPERPLASIA OF LEFT BREAST: ICD-10-CM

## 2024-12-18 DIAGNOSIS — Z09 POSTOP CHECK: ICD-10-CM

## 2024-12-18 DIAGNOSIS — D36.9 INTRADUCTAL PAPILLOMA: ICD-10-CM

## 2024-12-18 DIAGNOSIS — Z12.31 BREAST CANCER SCREENING BY MAMMOGRAM: Primary | ICD-10-CM

## 2024-12-18 PROCEDURE — 1159F MED LIST DOCD IN RCRD: CPT | Mod: CPTII,S$GLB,, | Performed by: STUDENT IN AN ORGANIZED HEALTH CARE EDUCATION/TRAINING PROGRAM

## 2024-12-18 PROCEDURE — 3074F SYST BP LT 130 MM HG: CPT | Mod: CPTII,S$GLB,, | Performed by: STUDENT IN AN ORGANIZED HEALTH CARE EDUCATION/TRAINING PROGRAM

## 2024-12-18 PROCEDURE — 3044F HG A1C LEVEL LT 7.0%: CPT | Mod: CPTII,S$GLB,, | Performed by: STUDENT IN AN ORGANIZED HEALTH CARE EDUCATION/TRAINING PROGRAM

## 2024-12-18 PROCEDURE — 99999 PR PBB SHADOW E&M-EST. PATIENT-LVL IV: CPT | Mod: PBBFAC,,, | Performed by: STUDENT IN AN ORGANIZED HEALTH CARE EDUCATION/TRAINING PROGRAM

## 2024-12-18 PROCEDURE — 3079F DIAST BP 80-89 MM HG: CPT | Mod: CPTII,S$GLB,, | Performed by: STUDENT IN AN ORGANIZED HEALTH CARE EDUCATION/TRAINING PROGRAM

## 2024-12-18 PROCEDURE — 99024 POSTOP FOLLOW-UP VISIT: CPT | Mod: S$GLB,,, | Performed by: STUDENT IN AN ORGANIZED HEALTH CARE EDUCATION/TRAINING PROGRAM

## 2024-12-18 NOTE — PROGRESS NOTES
[facility] Breast Surg  Breast Surgical Oncology  Established Patient Office Visit - H&P      Care Team: Patient Care Team:  Jeanette Arzola MD as PCP - General (Family Medicine)   Referring Provider: No ref. provider found    Chief Complaint:   Chief Complaint   Patient presents with    Post-op Evaluation     Patient denies any pain, redness, swelling, not warm to touch, discharge or discoloration at the excision site          Subjective:    Treatments:  BL excisional biopsy 12/10/24: Left breast IP w atypia. Right breast - IP.     Interval History:  12/23/2024 - Dante Bro returns today for post-op. She is s/p bilateral breast excisional biopsy.  She is doing well postoperatively.     History of Present Illness:  Dante Bro is a pleasant 54 y.o.female who presents as a referral from Jeanette Arzola MD for BL breast papillomas. She recently had four biopsies (2 in each breast) and she has an atypical papilloma in the left breast and a papilloma without atypia in the right breast.  One of the biopsies in her right breast she developed swelling about the size of a tomato which has decreased in size.  And she is still relatively sore in that area.  She has never had any breast surgeries or biopsies prior to this. She did have R nipple discharge about 4 years ago that was clear or brown but resolved and she denies any current concerns with her breasts, prior to her biopsy.   No family history of breast cancer or ovarian cancer. She has 2 children.  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     Lifetime risk of breast cancer calculated 29%.     Imaging:   Bilateral diagnostic mammogram 08/19/2024:  Density C, BI-RADS 4.  Right breast:  1:00 a.m. axis posterior depth there grouped coarse heterogeneous calcifications that are suspicious.  Left breast: 11-1 o'clock axis posterior to anterior depth there heterogeneous coarse calcifications that are suspicious.  In the 9:00  left breast subareolar there is a oval mass.  Ultrasound:  Right breast at 12-1 o'clock, 1 cm from the nipple there is a 0.7 x 0.5 x 0.7 cm vascular intraductal mass.  This correlates with the patient's history of right nipple discharge in his suspicious.  In the left breast 9:00 axis subareolar position there is a 1 x 0.5 x 0.9 cm oval mass with indistinct margins.  In the left breast 11:00 axis there is mild duct ectasia with echogenic intraductal foci compatible with calcifications and this correlates with the calcifications seen on mammogram in the 11-1 o'clock axis.  Biopsy bilateral breast masses 09/26/2024:  Site #1: Left breast 11:00-1:00 axes posterior to anterior depths segmental coarse heterogeneous calcifications ; T3-coil clip  Site #2: Right breast 1:00 axis posterior depth grouped coarse heterogeneous calcifications ; T3-coil clip  Site #3: Left breast 9:00 axis subareolar mass ; T4-butterfly clip  Site #4: Right breast 12:30 axis 1 cm FN intraductal mass ; T1-barrel clip  MRI breast 11/05/2024: BIRADS 2.   Right breast:   In the central region anterior depth, there is postsurgical change and a metallic clip (T1-barrel) related to recent ultrasound-guided core needle biopsy of an intraductal papilloma.  There is no enhancement unique to the BPE at or around this core needle biopsy site.  In the 1:00 axis posterior depth, there is postsurgical change and a metallic clip (T3-coil) related to recent benign stereotactic guided core needle biopsy of calcifications.  There is no enhancement unique to the BPE at or around this core needle biopsy site.   Left breast:  In the 12:00 axis middle depth, there is postsurgical change and a metallic clip (T3-coil) related to recent benign stereotactic guided core needle biopsy of calcifications.  There is no enhancement unique to the BPE at or around this core needle biopsy site.  In the 9:00 axis central region middle depth, there is postsurgical change and a  metallic clip (T4-butterfly) related to recent high-risk benign ultrasound-guided core needle biopsy of an atypical papilloma.  There is no enhancement unique to the BPE at or around this core needle biopsy site.      Pathology:   CORE BREAST BIOPSIES 9/26/24:  Site #1: Left breast 11:00-1:00 axes posterior to anterior depths calcifications ; T3-coil clip:  Florid ductal hyperplasia of the usual type, focal.  Mammary duct ectasia.  Fibroadenomatoid change with microcalcifications.  Papillary lesion, fragmented and minimally represented, not further classified.  Site #2: Right breast 1:00 axis posterior depth calcifications ; T3-coil clip:  Sclerosing adenosis with the associated abundant microcalcifications, no evidence of malignancy.  Columnar cell changes.  Fibroadenoma.  Left breast, subareolar mass at 9:00 core biopsy:  Intraductal papilloma with superimposed florid epithelial hyperplasia, atypical ductal hyperplasia and apocrine metaplasia, atypical papilloma.  Right breast mass at 12:30, 1 cm from nipple core biopsy:  Intraductal papilloma with superimposed florid epithelial hyperplasia of the usual type and apocrine metaplasia.  Bilateral excisional biopsies 12/10/2024:  Right excisional biopsy: Intraductal papilloma, fibroadenoma, proliferative and nonproliferative fibrocystic changes.  Site of prior biopsy.  Calcifications with a benign breast elements.  Left excisional biopsy: Atypical ductal hyperplasia.  Intraductal papillomas.  Proliferative and nonproliferative fibrocystic changes.  Left lateral margin: Intraductal papilloma.  Ectatic ducts.  Calcifications with a benign breast elements.       OB / GYN History   Menarche Onset:12  Menopause: Menopause: perimenopausal  Hormonal birth control (duration): 0years  Pregnancies: 2  Age at first child birth: 33  Child births: 2  Hysterectomy/Oophorectomy: no  HRT: no    Family History of Cancer (& age at diagnosis):  -   Family History   Problem Relation Name  Age of Onset    Liver cancer Father Earle Gutiérrez 72    Cancer Father Earle Gutiérrez     Asthma Brother Braulio Gutiérrez     Breast cancer Neg Hx          Lifestyle:  Height and Weight:   BMI: Body mass index is 18.63 kg/m².     Other:  MG breast density: C  Prior thoracic RT: none  Genetic testing: No  Ashkenazi Catholic descent: No    Other History:     Past Medical History:   Diagnosis Date    Breast mass   benign     bilateral    Other seasonal allergic rhinitis         Past Surgical History:   Procedure Laterality Date    BREAST SURGERY      EXCISION, MASS, BREAST, USING RADIOLOGICAL MARKER Bilateral 12/10/2024    Procedure: EXCISION,MASS,BREAST,USING RADIOLOGICAL MARKER - Magseed Localization // Bilateral need ultrasound, need faxitron, need sentimag;  Surgeon: Elise Fry MD;  Location: [facility] OR;  Service: General;  Laterality: Bilateral;  need ultrasound, need faxitron, need sentimag    no past surgery          Social History     Socioeconomic History    Marital status:    Tobacco Use    Smoking status: Never     Passive exposure: Never    Smokeless tobacco: Never   Substance and Sexual Activity    Alcohol use: Never    Drug use: Never    Sexual activity: Not Currently     Partners: Male     Birth control/protection: None     Social Drivers of Health     Financial Resource Strain: Low Risk  (4/24/2024)    Overall Financial Resource Strain (CARDIA)     Difficulty of Paying Living Expenses: Not hard at all   Food Insecurity: No Food Insecurity (4/24/2024)    Hunger Vital Sign     Worried About Running Out of Food in the Last Year: Never true     Ran Out of Food in the Last Year: Never true   Transportation Needs: No Transportation Needs (4/24/2024)    PRAPARE - Transportation     Lack of Transportation (Medical): No     Lack of Transportation (Non-Medical): No   Physical Activity: Sufficiently Active (4/24/2024)    Exercise Vital Sign     Days of Exercise per Week: 7 days     Minutes of  "Exercise per Session: 60 min   Stress: No Stress Concern Present (4/24/2024)    [employer] - Occupational Stress Questionnaire     Feeling of Stress : Not at all   Housing Stability: Low Risk  (4/24/2024)    Housing Stability Vital Sign     Unable to Pay for Housing in the Last Year: No     Homeless in the Last Year: No        Immunization History   Administered Date(s) Administered    COVID-19, MRNA, LN-S, PF (Pfizer) (Purple Cap) 09/10/2021, 10/01/2021    Influenza - Quadrivalent - PF *Preferred* (6 months and older) 11/29/2022, 11/16/2023    Influenza - Trivalent - Fluarix, Flulaval, Fluzone, Afluria - PF 11/15/2024        Medications/Allergies:       Current Outpatient Medications   Medication Instructions    ascorbic acid (VITAMIN C ORAL) Take by mouth.    UNABLE TO FIND medication name: Incentive spirometry       Review of patient's allergies indicates:   Allergen Reactions    Ibuprofen Swelling        Review of Systems:        See HPI for pertinent ROS        Objective/Physical Exam   Visit Vitals  /82 (BP Location: Left arm, Patient Position: Sitting)   Pulse 60   Temp 98 °F (36.7 °C) (Oral)   Resp 16   Ht 5' 1" (1.549 m)   Wt 44.7 kg (98 lb 9.6 oz)   LMP 08/01/2024 (Approximate)   BMI 18.63 kg/m²          Physical Exam    Bilateral breast incisions: .  Clean, dry, intact.  No erythema.  No swelling.    Assessment and Plan     1. Breast cancer screening by mammogram  - Mammo Digital Screening Bilat w/ Abel; Future    2. Intraductal papilloma    3. Atypical ductal hyperplasia of left breast    4. Postop check      Dante Bro is a 54 y.o.female who presents for postop after BL excisional biopsy.  Pathology was discussed with her which was benign papillomas with ADH in the left breast.  We discussed that she is considered high-risk and I recommend continuing to follow up in our high-risk breast clinic.  We did discuss chemoprevention and she is not interested. Screening " mammogram due in June.  Return to clinic after for high-risk follow-up.  MRI will be due in November of 2025.    The patient was encouraged to continue her self breast exam. If she notes any changes or has any concerns with her breast in the interim she was encouraged to call the breast center to schedule an appointment as soon as possible.          Elise Fry MD  Breast Surgical Oncology

## 2024-12-18 NOTE — PROGRESS NOTES
CT IMPRESSION  1. Findings suggestive of chronic process such is atelectasis versus scarring at the lingula, with component of infectious/inflammatory etiology not excluded.  2. Otherwise, no suspicious cardiopulmonary findings.  3. Incidental findings of the bilateral breast tissues discussed above, needing correlation with mammographic history    CT scan demonstrates atelectasis versus infectious/inflammatory etiolgy  Please find out if the patient is symptomatic for any fevers, chest pain, cough, fatigue, wheeze,  shortness of breath.  Yes then patient will need anterior.  Referral to pulmonology sent  Incentive spirometry ordered for atlectasis.  Please fax it to [facility]  Please fax the results Dr. Lisa Fry's office for bilateral breast findings.   Breast pathology results reviewed-intraductal papilloma

## 2024-12-18 NOTE — TELEPHONE ENCOUNTER
Called pt to discuss lab results. Pt voiced understanding and thanks!     Faxed to Dr. Elise Fry.

## 2024-12-18 NOTE — TELEPHONE ENCOUNTER
----- Message from Jeanette Arzola MD sent at 12/17/2024  9:12 PM CST -----  CT IMPRESSION  1. Findings suggestive of chronic process such is atelectasis versus scarring at the lingula, with component of infectious/inflammatory etiology not excluded.  2. Otherwise, no suspicious cardiopulmonary findings.  3. Incidental findings of the bilateral breast tissues discussed above, needing correlation with mammographic history    CT scan demonstrates atelectasis versus infectious/inflammatory etiolgy  Please find out if the patient is symptomatic for any fevers, chest pain, cough, fatigue, wheeze,  shortness of breath.  Yes then patient will need anterior.  Referral to pulmonology sent  Incentive spirometry ordered for atlectasis.  Please fax it to [facility]  Please fax the results Dr. Lisa Fry's office for bilateral breast findings.   Breast pathology results reviewed-intraductal papilloma

## 2025-02-25 ENCOUNTER — OFFICE VISIT (OUTPATIENT)
Dept: FAMILY MEDICINE | Facility: CLINIC | Age: 55
End: 2025-02-25
Payer: COMMERCIAL

## 2025-02-25 VITALS
HEART RATE: 60 BPM | RESPIRATION RATE: 18 BRPM | SYSTOLIC BLOOD PRESSURE: 133 MMHG | BODY MASS INDEX: 19.83 KG/M2 | DIASTOLIC BLOOD PRESSURE: 85 MMHG | HEIGHT: 61 IN | TEMPERATURE: 98 F | OXYGEN SATURATION: 99 % | WEIGHT: 105 LBS

## 2025-02-25 DIAGNOSIS — H02.834 DERMATOCHALASIS OF BOTH UPPER EYELIDS: ICD-10-CM

## 2025-02-25 DIAGNOSIS — H02.831 DERMATOCHALASIS OF BOTH UPPER EYELIDS: ICD-10-CM

## 2025-02-25 DIAGNOSIS — Z01.818 PREOP EXAMINATION: ICD-10-CM

## 2025-02-25 DIAGNOSIS — H57.813 BROW PTOSIS, BILATERAL: ICD-10-CM

## 2025-02-25 DIAGNOSIS — Z00.00 WELL ADULT HEALTH CHECK: ICD-10-CM

## 2025-02-25 DIAGNOSIS — N39.0 RECURRENT UTI: ICD-10-CM

## 2025-02-25 PROCEDURE — 3075F SYST BP GE 130 - 139MM HG: CPT | Mod: CPTII,,, | Performed by: STUDENT IN AN ORGANIZED HEALTH CARE EDUCATION/TRAINING PROGRAM

## 2025-02-25 PROCEDURE — 1159F MED LIST DOCD IN RCRD: CPT | Mod: CPTII,,, | Performed by: STUDENT IN AN ORGANIZED HEALTH CARE EDUCATION/TRAINING PROGRAM

## 2025-02-25 PROCEDURE — 3079F DIAST BP 80-89 MM HG: CPT | Mod: CPTII,,, | Performed by: STUDENT IN AN ORGANIZED HEALTH CARE EDUCATION/TRAINING PROGRAM

## 2025-02-25 PROCEDURE — 99214 OFFICE O/P EST MOD 30 MIN: CPT | Mod: ,,, | Performed by: STUDENT IN AN ORGANIZED HEALTH CARE EDUCATION/TRAINING PROGRAM

## 2025-02-25 PROCEDURE — 3008F BODY MASS INDEX DOCD: CPT | Mod: CPTII,,, | Performed by: STUDENT IN AN ORGANIZED HEALTH CARE EDUCATION/TRAINING PROGRAM

## 2025-02-25 PROCEDURE — G2211 COMPLEX E/M VISIT ADD ON: HCPCS | Mod: ,,, | Performed by: STUDENT IN AN ORGANIZED HEALTH CARE EDUCATION/TRAINING PROGRAM

## 2025-02-25 PROCEDURE — 1160F RVW MEDS BY RX/DR IN RCRD: CPT | Mod: CPTII,,, | Performed by: STUDENT IN AN ORGANIZED HEALTH CARE EDUCATION/TRAINING PROGRAM

## 2025-02-25 NOTE — PROGRESS NOTES
Patient ID: [id number]     Chief Complaint: Post-op Evaluation        HPI:     Dante Bro is a 54 y.o. female here today for a surgery clearance. Plans to have . No other complaints today.   Patient is here to get the clearance for Dermatochalasis of both upper eyelids and Brow ptosis, bilateral.  Surgery to be planned after clearance.  Surgeon-Dr. Rodolfo Russ    -Denies any previous problems with anesthesia   -Denies any bleeding or clotting disorder   -Not taking any blood thinners   -No history of smoking   -No prior history of CAD, heart failure or CVA -normal sinus EKG on 12/09/2024    Pre-op clearance    Risk/complexity of surgery:   [] High risk (> 5% MI risk): cardiac and aortic and peripheral vascular surgery   []  Intermediate risk (1-5% MI risk): head and neck (including CEA), intraperitoneal, intrathoracic, orthopedic, prostate   [x]  Low risk (1% MI risk): endoscopic procedures, cataract surgery, breast surgery     Current medical problems: Is EKG needed?   [] Active CVD   [] Creatinine > 2.0   []  DM on insulin   [] QT prolonging drugs   [] Hx of abnormal EKG   []  HTN   No active CVD, creatinine > 2.0, DM on insulin - therefore no ECG required     Functional status: < 4 METs defined as can do light housework (dishwashing), can walk two blocks on level ground, cannot climb a flight of stairs, walk up a hill, or run   < 4 METs   [x]  Low risk procedure: no testing required   []  Intermediate/high risk procedure: EKG and stress testing   > 4 METs   [] Low/intermediate risk procedure: no testing required   [] High risk procedure: EKG required     Hemostasis: No blood thinner use  []  Patient does use aspirin   []  Hx of MI or PCI - discontinuing results in 3x risk if previous MI, 90x risk if previous PCI     Hematology/Coagulopathy   []  CKD (uremia), liver disease (PT, PTT), inflammatory disease, procedure with excessive blood loss - needs CBC    Pregnancy: Post menopausal  History of anesthesia  problems: No   Smoking status: non-smoker   Social support: Yes    Revised cardiac index:   [] History of CVA   []  CHF   []  Creatinine > 2.0   []  DM on insulin   []  Ischemic heart disease   []  Suprainguinal vascular, intrathoracic, or intra-abdominal surgical site     Total:   0 - 0.4% risk perioperative cardiac event     Special considerations:   []  Joint or heart valve replacement - urinalysis needed   []  Rheumatoid arthritis history - cervical spine series for atlantoaxial subluxation needed   []  Recent PCI (bare metal within 4 weeks, GOKUL within 1 year) - surgery should be avoided if possible   []  RODRIGUEZ - patient is at risk for RODRIGUEZ and should be screened prior to procedure; patient is compliant with CPAP and should continue use while asleep in hospital at 0 mmH2O     Medications:   []  Plavix/ticlopidine - stop 5-7 days prior to surgery, resume with PO intake   []  NSAIDs - stop 1-3 days prior   []  Pradaxa - stop 2-5 days prior, resume with PO intake   []  Xarelto - stop at least 24 hrs prior, resume with PO intake   []  Coumadin   []  Low thromboembolic risk (Afib with no embolism in 12 mo, biologic heart valves > 3 mo ago) - Stop 5 days pre-op and restart post-op   []  High thromboembolic risk (mechanical heart valve, VTE with hypercoagulable state or < 3 mo ago) - Stop 4 days pre-op and start LMWH, stop LMWH 12-18h pre-op, restart LMWH 6h post-op, restart warfarin with PO intake, stop LMWH when INR = 2.0   []  Steroids - please allow patient to continue daily dose  []  Insulin - please continue BASAL insulin, may hold pre-prandial until eating again     Preoperative Medication Adjustment:  AntiHTN: Hold diuretic, hold ace-inhibitors, hold arbs, continue others  Oral hypoglycemics: Hold on day of surgery; hold metformin 48 hours preoperatively  Insulin: Administer 1/3 to 1/2 of Basal Insulin; Hold short-acting and regular insulin  Sedative: Hold 24 hours preoperatively  Lithium: Check serum level, hold  on day of surgery  Levodopa/Carbidopa: Hold on day of surgery  Statins/Fibrates: Continue during surgery  Estrogen: Hold on day of surgery; resume when patient ambulates    Risk for surgical site infection secondary to:   []  Smoking   []  Diabetes    []  Obesity   []  Malnutrition   []  Chronic skin disease     Patient would also like to get a referral to urology because of UTI feeling.    -------------------------------------    Breast mass   benign    bilateral    Other seasonal allergic rhinitis        Past Surgical History:   Procedure Laterality Date    BREAST SURGERY      EXCISION, MASS, BREAST, USING RADIOLOGICAL MARKER Bilateral 12/10/2024    Procedure: EXCISION,MASS,BREAST,USING RADIOLOGICAL MARKER - [facility] Localization // Bilateral need ultrasound, need faxitron, need sentimag;  Surgeon: Elise Fry MD;  Location: [facility];  Service: General;  Laterality: Bilateral;  need ultrasound, need faxitron, need sentimag    no past surgery         Review of patient's allergies indicates:   Allergen Reactions    Ibuprofen Swelling       Current Outpatient Medications   Medication Instructions    ascorbic acid (VITAMIN C ORAL) Take by mouth.    UNABLE TO FIND medication name: Incentive spirometry       Social History[1]     Family History   Problem Relation Name Age of Onset    Liver cancer Father Earle Gutiérrez 72    Cancer Father Earle Gutiérrez     Asthma Brother Braulio Gutiérrez     Breast cancer Neg Hx          Patient Care Team:  Jeanette Arzola MD as PCP - General (Family Medicine)     Subjective:     ROS    See HPI for details    Constitutional: Denies Change in appetite. Denies Chills. Denies Fever. Denies Night sweats.  Eye: Denies Blurred vision. Denies Discharge. Denies Eye pain.  ENT: Denies Decreased hearing. Denies Sore throat. Denies Swollen glands.  Respiratory: Denies Cough. Denies Shortness of breath. Denies Shortness of breath with exertion. Denies Wheezing.  Cardiovascular:  "DeniesChest pain at rest. Denies Chest pain with exertion. Denies Irregular heartbeat. Denies Palpitations.  Gastrointestinal: Denies Abdominal pain. DeniesDiarrhea. Denies Nausea. Denies Vomiting. Denies Hematemesis or Hematochezia.  Genitourinary: Denies Dysuria. Denies Urinary frequency. Denies Urinary urgency. Denies Blood in urine.  Endocrine: Denies Cold intolerance. Denies Excessive thirst. Denies Heat intolerance. Denies Weight loss. Denies Weight gain.  Musculoskeletal: Denies Painful joints. Denies Weakness.  Integumentary: Denies Rash. Denies Itching. Denies Dry skin.  Neurologic: Denies Dizziness. Denies Fainting. Denies Headache.  Psychiatric: Denies Depression. Denies Anxiety. Denies Suicidal/Homicidal ideations.    All Other ROS: Negative except as stated in HPI.       Objective:     Visit Vitals  /85 (BP Location: Left arm, Patient Position: Sitting)   Pulse 60   Temp 98.1 °F (36.7 °C) (Oral)   Resp 18   Ht 5' 1" (1.549 m)   Wt 47.6 kg (105 lb)   LMP  (LMP Unknown)   SpO2 99%   BMI 19.84 kg/m²       Physical Exam    General: Alert and oriented, No acute distress.  Head: Normocephalic, Atraumatic.  Eye: Pupils are equal, round and reactive to light, Extraocular movements are intact, Sclera non-icteric.  Ears/Nose/Throat: Normal, Mucosa moist,Clear.  Neck/Thyroid: Supple, Non-tender, No carotid bruit, No palpable thyromegaly or thyroid nodule, No lymphadenopathy, No JVD, Full range of motion.  Respiratory: Clear to auscultation bilaterally; No wheezes, rales or rhonchi,Non-labored respirations, Symmetrical chest wall expansion.  Cardiovascular: Regular rate and rhythm, S1/S2 normal, No murmurs, rubs or gallops.  Gastrointestinal: Soft, Non-tender, Non-distended, Normal bowel sounds, No palpable organomegaly.  Musculoskeletal: Normal range of motion.  Integumentary: Warm, Dry, Intact, No suspicious lesions or rashes.  Extremities: No clubbing, cyanosis or edema  Neurologic: No focal deficits, " Cranial Nerves II-XII are grossly intact, Motor strength normal upper and lower extremities, Sensory exam intact.  Psychiatric: Normal interaction, Coherent speech, Euthymic mood, Appropriate affect       The 10-year ASCVD risk score (Angela SMILEY, et al., 2019) is: 1.9%    Values used to calculate the score:      Age: 54 years      Sex: Female      Is Non- : No      Diabetic: No      Tobacco smoker: No      Systolic Blood Pressure: 133 mmHg      Is BP treated: No      HDL Cholesterol: 55 mg/dL      Total Cholesterol: 233 mg/dL      Assessment:       ICD-10-CM ICD-9-CM   1. Preop examination  Z01.818 V72.84   2. Dermatochalasis of both upper eyelids  H02.831 374.87    H02.834    3. Brow ptosis, bilateral  H57.813 701.8   4. Well adult health check  Z00.00 V70.0   5. Recurrent UTI  N39.0 599.0        Plan:     1. Preop examination  2. Dermatochalasis of both upper eyelids  3. Brow ptosis, bilateral  The patient is cleared for the planned procedure as scheduled as all of their chronic conditions are optimally controlled.     4. Recurrent UTIs   Urology referral sent   Prophylactic Antibiotics declined    4. Well adult health check  - CBC Auto Differential; Future  - Comprehensive Metabolic Panel; Future  - Lipid Panel; Future  - TSH; Future  - Hemoglobin A1C; Future  - Urinalysis; Future  - T4, Free; Future  - Vitamin D; Future  - Microalbumin/Creatinine Ratio, Urine; Future  - Uric Acid; Future  - Vitamin B12; Future  - Folate; Future  - Urinalysis                Medication List with Changes/Refills   Current Medications    ASCORBIC ACID (VITAMIN C ORAL)    Take by mouth.       Start Date: --        End Date: --    UNABLE TO FIND    medication name: Incentive spirometry       Start Date: 12/17/2024End Date: --            Follow up in about 3 weeks (around 3/18/2025) for Annual Wellness and PAP smear.  In addition to their scheduled follow up, the patient has also been instructed to follow up on  as needed basis.          [1]   Social History  Socioeconomic History    Marital status:    Tobacco Use    Smoking status: Never     Passive exposure: Never    Smokeless tobacco: Never   Substance and Sexual Activity    Alcohol use: Never    Drug use: Never    Sexual activity: Not Currently     Partners: Male     Birth control/protection: None     Social Drivers of Health     Financial Resource Strain: Low Risk  (4/24/2024)    Overall Financial Resource Strain (CARDIA)     Difficulty of Paying Living Expenses: Not hard at all   Food Insecurity: No Food Insecurity (4/24/2024)    Hunger Vital Sign     Worried About Running Out of Food in the Last Year: Never true     Ran Out of Food in the Last Year: Never true   Transportation Needs: No Transportation Needs (4/24/2024)    PRAPARE - Transportation     Lack of Transportation (Medical): No     Lack of Transportation (Non-Medical): No   Physical Activity: Sufficiently Active (4/24/2024)    Exercise Vital Sign     Days of Exercise per Week: 7 days     Minutes of Exercise per Session: 60 min   Stress: No Stress Concern Present (4/24/2024)    [employer] - Occupational Stress Questionnaire     Feeling of Stress : Not at all   Housing Stability: Low Risk  (4/24/2024)    Housing Stability Vital Sign     Unable to Pay for Housing in the Last Year: No     Homeless in the Last Year: No

## 2025-03-11 ENCOUNTER — TELEPHONE (OUTPATIENT)
Dept: FAMILY MEDICINE | Facility: CLINIC | Age: 55
End: 2025-03-11
Payer: COMMERCIAL

## 2025-03-11 ENCOUNTER — PATIENT MESSAGE (OUTPATIENT)
Dept: FAMILY MEDICINE | Facility: CLINIC | Age: 55
End: 2025-03-11
Payer: COMMERCIAL

## 2025-03-11 NOTE — TELEPHONE ENCOUNTER
Called pt to confirm their appointment, but no answer. Left voicemail to call me back.   Are there any outstanding tasks in the patients's chart (ex.labs,MM,etc)? Future orders  Do we have outstanding/pending referrals? No   Has the patient been seen in and ER,UCC, or been admitted since last visit? No   Has the patient seen any other health care provider(doctors) since last visit? No   Has the patient had any bloodwork or x-rays done since last visit? No

## 2025-03-13 LAB
HM PAP SMEAR: NORMAL
HUMAN PAPILLOMAVIRUS (HPV): NORMAL
PAP RECOMMENDATION EXT: NORMAL

## 2025-03-14 ENCOUNTER — LAB VISIT (OUTPATIENT)
Dept: LAB | Facility: HOSPITAL | Age: 55
End: 2025-03-14
Attending: STUDENT IN AN ORGANIZED HEALTH CARE EDUCATION/TRAINING PROGRAM
Payer: COMMERCIAL

## 2025-03-14 ENCOUNTER — RESULTS FOLLOW-UP (OUTPATIENT)
Dept: FAMILY MEDICINE | Facility: CLINIC | Age: 55
End: 2025-03-14

## 2025-03-14 DIAGNOSIS — E78.2 MIXED HYPERLIPIDEMIA: Primary | ICD-10-CM

## 2025-03-14 DIAGNOSIS — Z00.00 WELL ADULT HEALTH CHECK: ICD-10-CM

## 2025-03-14 LAB
25(OH)D3+25(OH)D2 SERPL-MCNC: 56 NG/ML (ref 30–80)
ALBUMIN SERPL-MCNC: 4 G/DL (ref 3.5–5)
ALBUMIN/GLOB SERPL: 1.4 RATIO (ref 1.1–2)
ALP SERPL-CCNC: 69 UNIT/L (ref 40–150)
ALT SERPL-CCNC: 12 UNIT/L (ref 0–55)
ANION GAP SERPL CALC-SCNC: 7 MEQ/L
AST SERPL-CCNC: 17 UNIT/L (ref 5–34)
BASOPHILS # BLD AUTO: 0.13 X10(3)/MCL
BASOPHILS NFR BLD AUTO: 2.6 %
BILIRUB SERPL-MCNC: 0.5 MG/DL
BUN SERPL-MCNC: 12.8 MG/DL (ref 9.8–20.1)
CALCIUM SERPL-MCNC: 9.6 MG/DL (ref 8.4–10.2)
CHLORIDE SERPL-SCNC: 106 MMOL/L (ref 98–107)
CHOLEST SERPL-MCNC: 195 MG/DL
CHOLEST/HDLC SERPL: 3 {RATIO} (ref 0–5)
CO2 SERPL-SCNC: 27 MMOL/L (ref 22–29)
CREAT SERPL-MCNC: 0.75 MG/DL (ref 0.55–1.02)
CREAT UR-MCNC: 142.6 MG/DL (ref 45–106)
CREAT/UREA NIT SERPL: 17
EOSINOPHIL # BLD AUTO: 0.5 X10(3)/MCL (ref 0–0.9)
EOSINOPHIL NFR BLD AUTO: 10.2 %
ERYTHROCYTE [DISTWIDTH] IN BLOOD BY AUTOMATED COUNT: 12.6 % (ref 11.5–17)
EST. AVERAGE GLUCOSE BLD GHB EST-MCNC: 114 MG/DL
FOLATE SERPL-MCNC: 11.7 NG/ML (ref 7–31.4)
GFR SERPLBLD CREATININE-BSD FMLA CKD-EPI: >60 ML/MIN/1.73/M2
GLOBULIN SER-MCNC: 2.9 GM/DL (ref 2.4–3.5)
GLUCOSE SERPL-MCNC: 93 MG/DL (ref 74–100)
HBA1C MFR BLD: 5.6 %
HCT VFR BLD AUTO: 39.6 % (ref 37–47)
HDLC SERPL-MCNC: 64 MG/DL (ref 35–60)
HGB BLD-MCNC: 13.2 G/DL (ref 12–16)
IMM GRANULOCYTES # BLD AUTO: 0.01 X10(3)/MCL (ref 0–0.04)
IMM GRANULOCYTES NFR BLD AUTO: 0.2 %
LDLC SERPL CALC-MCNC: 120 MG/DL (ref 50–140)
LYMPHOCYTES # BLD AUTO: 1.88 X10(3)/MCL (ref 0.6–4.6)
LYMPHOCYTES NFR BLD AUTO: 38.2 %
MCH RBC QN AUTO: 29.5 PG (ref 27–31)
MCHC RBC AUTO-ENTMCNC: 33.3 G/DL (ref 33–36)
MCV RBC AUTO: 88.4 FL (ref 80–94)
MICROALBUMIN UR-MCNC: 10 UG/ML
MICROALBUMIN/CREAT RATIO PNL UR: 7 MG/GM CR (ref 0–30)
MONOCYTES # BLD AUTO: 0.29 X10(3)/MCL (ref 0.1–1.3)
MONOCYTES NFR BLD AUTO: 5.9 %
NEUTROPHILS # BLD AUTO: 2.11 X10(3)/MCL (ref 2.1–9.2)
NEUTROPHILS NFR BLD AUTO: 42.9 %
NRBC BLD AUTO-RTO: 0 %
PLATELET # BLD AUTO: 237 X10(3)/MCL (ref 130–400)
PMV BLD AUTO: 9.5 FL (ref 7.4–10.4)
POTASSIUM SERPL-SCNC: 3.8 MMOL/L (ref 3.5–5.1)
PROT SERPL-MCNC: 6.9 GM/DL (ref 6.4–8.3)
RBC # BLD AUTO: 4.48 X10(6)/MCL (ref 4.2–5.4)
SODIUM SERPL-SCNC: 140 MMOL/L (ref 136–145)
T4 FREE SERPL-MCNC: 1.18 NG/DL (ref 0.7–1.48)
TRIGL SERPL-MCNC: 53 MG/DL (ref 37–140)
TSH SERPL-ACNC: 1.13 UIU/ML (ref 0.35–4.94)
URATE SERPL-MCNC: 5.2 MG/DL (ref 2.6–6)
VIT B12 SERPL-MCNC: 521 PG/ML (ref 213–816)
VLDLC SERPL CALC-MCNC: 11 MG/DL
WBC # BLD AUTO: 4.92 X10(3)/MCL (ref 4.5–11.5)

## 2025-03-14 PROCEDURE — 82607 VITAMIN B-12: CPT

## 2025-03-14 PROCEDURE — 80053 COMPREHEN METABOLIC PANEL: CPT

## 2025-03-14 PROCEDURE — 36415 COLL VENOUS BLD VENIPUNCTURE: CPT

## 2025-03-14 PROCEDURE — 80061 LIPID PANEL: CPT

## 2025-03-14 PROCEDURE — 82043 UR ALBUMIN QUANTITATIVE: CPT

## 2025-03-14 PROCEDURE — 84550 ASSAY OF BLOOD/URIC ACID: CPT

## 2025-03-14 PROCEDURE — 82306 VITAMIN D 25 HYDROXY: CPT

## 2025-03-14 PROCEDURE — 82746 ASSAY OF FOLIC ACID SERUM: CPT

## 2025-03-14 PROCEDURE — 83036 HEMOGLOBIN GLYCOSYLATED A1C: CPT

## 2025-03-14 PROCEDURE — 84439 ASSAY OF FREE THYROXINE: CPT

## 2025-03-14 PROCEDURE — 85025 COMPLETE CBC W/AUTO DIFF WBC: CPT

## 2025-03-14 PROCEDURE — 84443 ASSAY THYROID STIM HORMONE: CPT

## 2025-03-14 RX ORDER — ATORVASTATIN CALCIUM 40 MG/1
40 TABLET, FILM COATED ORAL DAILY
Qty: 90 TABLET | Refills: 3 | Status: SHIPPED | OUTPATIENT
Start: 2025-03-14 | End: 2026-03-14

## 2025-03-28 ENCOUNTER — TELEPHONE (OUTPATIENT)
Dept: FAMILY MEDICINE | Facility: CLINIC | Age: 55
End: 2025-03-28
Payer: COMMERCIAL

## 2025-04-02 ENCOUNTER — TELEPHONE (OUTPATIENT)
Dept: FAMILY MEDICINE | Facility: CLINIC | Age: 55
End: 2025-04-02
Payer: COMMERCIAL

## 2025-04-08 ENCOUNTER — TELEPHONE (OUTPATIENT)
Dept: FAMILY MEDICINE | Facility: CLINIC | Age: 55
End: 2025-04-08

## 2025-05-05 DIAGNOSIS — N18.31 CKD STAGE 3A, GFR 45-59 ML/MIN: Primary | ICD-10-CM

## 2025-05-05 DIAGNOSIS — R79.89 ELEVATED SERUM CREATININE: ICD-10-CM

## 2025-05-07 ENCOUNTER — PATIENT MESSAGE (OUTPATIENT)
Dept: NEPHROLOGY | Facility: CLINIC | Age: 55
End: 2025-05-07
Payer: COMMERCIAL

## 2025-05-07 DIAGNOSIS — R79.89 ELEVATED SERUM CREATININE: ICD-10-CM

## 2025-05-07 DIAGNOSIS — N18.31 CKD STAGE 3A, GFR 45-59 ML/MIN: Primary | ICD-10-CM

## 2025-05-12 ENCOUNTER — LAB VISIT (OUTPATIENT)
Dept: LAB | Facility: HOSPITAL | Age: 55
End: 2025-05-12
Attending: INTERNAL MEDICINE
Payer: COMMERCIAL

## 2025-05-12 DIAGNOSIS — R79.89 ELEVATED SERUM CREATININE: ICD-10-CM

## 2025-05-12 DIAGNOSIS — N18.31 CKD STAGE 3A, GFR 45-59 ML/MIN: ICD-10-CM

## 2025-05-12 LAB
ALBUMIN SERPL-MCNC: 3.9 G/DL (ref 3.5–5)
ALBUMIN/GLOB SERPL: 1.1 RATIO (ref 1.1–2)
ALP SERPL-CCNC: 73 UNIT/L (ref 40–150)
ALT SERPL-CCNC: 13 UNIT/L (ref 0–55)
ANION GAP SERPL CALC-SCNC: 9 MEQ/L
AST SERPL-CCNC: 17 UNIT/L (ref 11–45)
BACTERIA #/AREA URNS AUTO: ABNORMAL /HPF
BASOPHILS # BLD AUTO: 0.09 X10(3)/MCL
BASOPHILS NFR BLD AUTO: 1.8 %
BILIRUB SERPL-MCNC: 0.5 MG/DL
BILIRUB UR QL STRIP.AUTO: NEGATIVE
BUN SERPL-MCNC: 9 MG/DL (ref 9.8–20.1)
CALCIUM SERPL-MCNC: 9.8 MG/DL (ref 8.4–10.2)
CHLORIDE SERPL-SCNC: 101 MMOL/L (ref 98–107)
CLARITY UR: CLEAR
CO2 SERPL-SCNC: 27 MMOL/L (ref 22–29)
COLOR UR AUTO: COLORLESS
CREAT SERPL-MCNC: 0.75 MG/DL (ref 0.55–1.02)
CREAT UR-MCNC: 26 MG/DL (ref 45–106)
CREAT/UREA NIT SERPL: 12
EOSINOPHIL # BLD AUTO: 0.35 X10(3)/MCL (ref 0–0.9)
EOSINOPHIL NFR BLD AUTO: 7.2 %
ERYTHROCYTE [DISTWIDTH] IN BLOOD BY AUTOMATED COUNT: 12 % (ref 11.5–17)
GFR SERPLBLD CREATININE-BSD FMLA CKD-EPI: >60 ML/MIN/1.73/M2
GLOBULIN SER-MCNC: 3.7 GM/DL (ref 2.4–3.5)
GLUCOSE SERPL-MCNC: 94 MG/DL (ref 74–100)
GLUCOSE UR QL STRIP: NORMAL
HCT VFR BLD AUTO: 46.9 % (ref 37–47)
HGB BLD-MCNC: 15.1 G/DL (ref 12–16)
HGB UR QL STRIP: NEGATIVE
IMM GRANULOCYTES # BLD AUTO: 0.01 X10(3)/MCL (ref 0–0.04)
IMM GRANULOCYTES NFR BLD AUTO: 0.2 %
KETONES UR QL STRIP: NEGATIVE
LEUKOCYTE ESTERASE UR QL STRIP: NEGATIVE
LYMPHOCYTES # BLD AUTO: 2.14 X10(3)/MCL (ref 0.6–4.6)
LYMPHOCYTES NFR BLD AUTO: 43.9 %
MCH RBC QN AUTO: 28.8 PG (ref 27–31)
MCHC RBC AUTO-ENTMCNC: 32.2 G/DL (ref 33–36)
MCV RBC AUTO: 89.5 FL (ref 80–94)
MONOCYTES # BLD AUTO: 0.37 X10(3)/MCL (ref 0.1–1.3)
MONOCYTES NFR BLD AUTO: 7.6 %
MUCOUS THREADS URNS QL MICRO: ABNORMAL /LPF
NEUTROPHILS # BLD AUTO: 1.91 X10(3)/MCL (ref 2.1–9.2)
NEUTROPHILS NFR BLD AUTO: 39.3 %
NITRITE UR QL STRIP: NEGATIVE
NRBC BLD AUTO-RTO: 0 %
PH UR STRIP: 5.5 [PH]
PLATELET # BLD AUTO: 246 X10(3)/MCL (ref 130–400)
PMV BLD AUTO: 9.6 FL (ref 7.4–10.4)
POTASSIUM SERPL-SCNC: 3.8 MMOL/L (ref 3.5–5.1)
PROT SERPL-MCNC: 7.6 GM/DL (ref 6.4–8.3)
PROT UR QL STRIP: NEGATIVE
PROT UR STRIP-MCNC: <6.8 MG/DL
PTH-INTACT SERPL-MCNC: 78.5 PG/ML (ref 8.7–77)
RBC # BLD AUTO: 5.24 X10(6)/MCL (ref 4.2–5.4)
RBC #/AREA URNS AUTO: ABNORMAL /HPF
SODIUM SERPL-SCNC: 137 MMOL/L (ref 136–145)
SP GR UR STRIP.AUTO: 1 (ref 1–1.03)
SQUAMOUS #/AREA URNS LPF: ABNORMAL /HPF
UROBILINOGEN UR STRIP-ACNC: NORMAL
WBC # BLD AUTO: 4.87 X10(3)/MCL (ref 4.5–11.5)
WBC #/AREA URNS AUTO: ABNORMAL /HPF

## 2025-05-12 PROCEDURE — 80053 COMPREHEN METABOLIC PANEL: CPT

## 2025-05-12 PROCEDURE — 84156 ASSAY OF PROTEIN URINE: CPT

## 2025-05-12 PROCEDURE — 81001 URINALYSIS AUTO W/SCOPE: CPT

## 2025-05-12 PROCEDURE — 36415 COLL VENOUS BLD VENIPUNCTURE: CPT

## 2025-05-12 PROCEDURE — 83970 ASSAY OF PARATHORMONE: CPT

## 2025-05-12 PROCEDURE — 85025 COMPLETE CBC W/AUTO DIFF WBC: CPT

## 2025-05-13 ENCOUNTER — OFFICE VISIT (OUTPATIENT)
Dept: URGENT CARE | Facility: CLINIC | Age: 55
End: 2025-05-13
Payer: COMMERCIAL

## 2025-05-13 VITALS
HEIGHT: 61 IN | RESPIRATION RATE: 16 BRPM | BODY MASS INDEX: 19.83 KG/M2 | SYSTOLIC BLOOD PRESSURE: 126 MMHG | DIASTOLIC BLOOD PRESSURE: 85 MMHG | HEART RATE: 61 BPM | TEMPERATURE: 98 F | OXYGEN SATURATION: 100 % | WEIGHT: 105 LBS

## 2025-05-13 DIAGNOSIS — R30.0 DYSURIA: Primary | ICD-10-CM

## 2025-05-13 DIAGNOSIS — N39.0 ACUTE UTI: ICD-10-CM

## 2025-05-13 LAB
BILIRUB UR QL STRIP: NEGATIVE
GLUCOSE UR QL STRIP: NEGATIVE
KETONES UR QL STRIP: NEGATIVE
LEUKOCYTE ESTERASE UR QL STRIP: POSITIVE
PH, POC UA: 6
POC BLOOD, URINE: NEGATIVE
POC NITRATES, URINE: NEGATIVE
PROT UR QL STRIP: NEGATIVE
SP GR UR STRIP: 1 (ref 1–1.03)
UROBILINOGEN UR STRIP-ACNC: NEGATIVE (ref 0.1–1.1)

## 2025-05-13 PROCEDURE — 99213 OFFICE O/P EST LOW 20 MIN: CPT | Mod: ,,, | Performed by: PHYSICIAN ASSISTANT

## 2025-05-13 PROCEDURE — 81003 URINALYSIS AUTO W/O SCOPE: CPT | Mod: QW,,, | Performed by: PHYSICIAN ASSISTANT

## 2025-05-13 PROCEDURE — 87077 CULTURE AEROBIC IDENTIFY: CPT | Performed by: PHYSICIAN ASSISTANT

## 2025-05-13 RX ORDER — NITROFURANTOIN 25; 75 MG/1; MG/1
100 CAPSULE ORAL 2 TIMES DAILY
Qty: 20 CAPSULE | Refills: 0 | Status: SHIPPED | OUTPATIENT
Start: 2025-05-13 | End: 2025-05-23

## 2025-05-13 NOTE — PATIENT INSTRUCTIONS
Urine analysis and urine collected today we will be sent for urine culture.  May start Macrobid antibiotic coverage concern for urinary tract infection.  Highly recommend continued follow-up with primary care physician, urologists in nephrologists for further monitoring.  If pain worsens prior to appointment with your physicians recommend go directly to emergency department for further evaluation.

## 2025-05-13 NOTE — PROGRESS NOTES
"Subjective:      Patient ID: Dante Bro is a 55 y.o. female.    Vitals:  height is 5' 1" (1.549 m) and weight is 47.6 kg (105 lb). Her temperature is 97.7 °F (36.5 °C). Her blood pressure is 126/85 and her pulse is 61. Her respiration is 16 and oxygen saturation is 100%.     Chief Complaint: Urinary Tract Infection    Female with low back discomfort, dysuria, urinary frequency over the last 2 weeks seen by Urology last week concern for overactive bladder with referral to Nephrology this week upcoming in the next 2 days reports having continued dysuria with outpatient urinalysis yesterday concern for bacteria in urine presents to urgent Care for re-evaluation prior to upcoming nephrology appointment.    Urinary Tract Infection   Associated symptoms include frequency.     Constitution: Negative for fever.   Genitourinary:  Positive for dysuria and frequency.   Musculoskeletal:  Positive for back pain.   Skin:  Negative for erythema.      Objective:     Physical Exam   Constitutional: She is oriented to person, place, and time. She appears well-developed. She is cooperative. No distress.      Comments:Awake alert pleasant ambulatory female     HENT:   Head: Normocephalic.   Mouth/Throat: Oropharynx is clear and moist and mucous membranes are normal.   Eyes: Conjunctivae and lids are normal.   Neck: Trachea normal and phonation normal. Neck supple.   Cardiovascular: Normal rate and normal pulses.   Pulmonary/Chest: Effort normal.   Abdominal: Normal appearance. She exhibits no distension. Soft. flat abdomen There is no abdominal tenderness. There is no guarding, no left CVA tenderness and no right CVA tenderness.   Musculoskeletal: Normal range of motion.         General: No swelling. Normal range of motion.   Neurological: no focal deficit. She is alert and oriented to person, place, and time. She has normal strength and normal reflexes. She displays no weakness. No sensory deficit.   Skin: Skin is warm, dry, intact, " "not diaphoretic, not pale and no rash. No erythema and No lesion no jaundice  Psychiatric: Her speech is normal and behavior is normal. Judgment and thought content normal.   Nursing note and vitals reviewed.       Previous History      Review of patient's allergies indicates:   Allergen Reactions    Ibuprofen Swelling    Amoxicillin (bulk) Diarrhea       Past Medical History:   Diagnosis Date    Breast mass   benign     bilateral    Other seasonal allergic rhinitis      Current Outpatient Medications   Medication Instructions    ascorbic acid (VITAMIN C ORAL) Take by mouth.    atorvastatin (LIPITOR) 40 mg, Oral, Daily    nitrofurantoin, macrocrystal-monohydrate, (MACROBID) 100 MG capsule 100 mg, Oral, 2 times daily    UNABLE TO FIND medication name: Incentive spirometry     Past Surgical History:   Procedure Laterality Date    BREAST SURGERY      EXCISION, MASS, BREAST, USING RADIOLOGICAL MARKER Bilateral 12/10/2024    Procedure: EXCISION,MASS,BREAST,USING RADIOLOGICAL MARKER - [facility] Localization // Bilateral need ultrasound, need faxitron, need sentimag;  Surgeon: Elise Fry MD;  Location: [facility];  Service: General;  Laterality: Bilateral;  need ultrasound, need faxitron, need sentimag    no past surgery       Family History   Problem Relation Name Age of Onset    No Known Problems Mother      Liver cancer Father Earle Gutiérrez 72    Cancer Father Earle Gutiérrez     Asthma Brother Braulio Gutiérrez     Breast cancer Neg Hx         Social History[1]     Physical Exam      Vital Signs Reviewed   /85   Pulse 61   Temp 97.7 °F (36.5 °C)   Resp 16   Ht 5' 1" (1.549 m)   Wt 47.6 kg (105 lb)   SpO2 100%   BMI 19.84 kg/m²        Procedures    Procedures     Labs     Results for orders placed or performed in visit on 05/13/25   POCT Urinalysis, Dipstick, Automated, W/O Scope    Collection Time: 05/13/25 10:27 AM   Result Value Ref Range    POC Blood, Urine Negative Negative    POC Bilirubin, " Urine Negative Negative    POC Urobilinogen, Urine Negative 0.1 - 1.1    POC Ketones, Urine Negative Negative    POC Protein, Urine Negative Negative    POC Nitrates, Urine Negative Negative    POC Glucose, Urine Negative Negative    pH, UA 6     POC Specific Gravity, Urine 1.005 1.003 - 1.029    POC Leukocytes, Urine Positive (A) Negative         Assessment:     1. Dysuria    2. Acute UTI        Plan:     Urine analysis and urine collected today we will be sent for urine culture.  May start Macrobid antibiotic coverage concern for urinary tract infection.  Highly recommend continued follow-up with primary care physician, urologists in nephrologists for further monitoring.  If pain worsens prior to appointment with your physicians recommend go directly to emergency department for further evaluation.  Dysuria  -     POCT Urinalysis, Dipstick, Automated, W/O Scope    Acute UTI  -     Urine Culture High Risk    Other orders  -     nitrofurantoin, macrocrystal-monohydrate, (MACROBID) 100 MG capsule; Take 1 capsule (100 mg total) by mouth 2 (two) times daily. for 10 days  Dispense: 20 capsule; Refill: 0                         [1]   Social History  Tobacco Use    Smoking status: Never     Passive exposure: Never    Smokeless tobacco: Never   Substance Use Topics    Alcohol use: Not Currently    Drug use: Never

## 2025-05-14 ENCOUNTER — RESULTS FOLLOW-UP (OUTPATIENT)
Dept: URGENT CARE | Facility: CLINIC | Age: 55
End: 2025-05-14

## 2025-05-15 ENCOUNTER — OFFICE VISIT (OUTPATIENT)
Dept: NEPHROLOGY | Facility: CLINIC | Age: 55
End: 2025-05-15
Payer: COMMERCIAL

## 2025-05-15 VITALS
HEIGHT: 61 IN | BODY MASS INDEX: 18.54 KG/M2 | OXYGEN SATURATION: 100 % | RESPIRATION RATE: 18 BRPM | HEART RATE: 57 BPM | TEMPERATURE: 98 F | WEIGHT: 98.19 LBS | SYSTOLIC BLOOD PRESSURE: 122 MMHG | DIASTOLIC BLOOD PRESSURE: 79 MMHG

## 2025-05-15 DIAGNOSIS — R79.89 ELEVATED SERUM CREATININE: ICD-10-CM

## 2025-05-15 DIAGNOSIS — R10.9 FLANK PAIN: ICD-10-CM

## 2025-05-15 DIAGNOSIS — N18.31 CKD STAGE 3A, GFR 45-59 ML/MIN: Primary | ICD-10-CM

## 2025-05-15 LAB — BACTERIA UR CULT: ABNORMAL

## 2025-05-15 PROCEDURE — 99999 PR PBB SHADOW E&M-EST. PATIENT-LVL V: CPT | Mod: PBBFAC,,, | Performed by: INTERNAL MEDICINE

## 2025-05-15 NOTE — PROGRESS NOTES
[facility] New Referral Office Note    HPI  Dante Bro, 55 y.o. female, presents to office as a new patient   Patient was told that she has CKD 2 or CKD 3 she is very concerned  I tolerated looked at her previous lab patterns her creatinines always less than 1 GFR was more than 60  Her urine is also bland and she has no proteinuria  Patient has been seen urology for recurrent UTIs and occasional urinary frequency  No leg edema no skin rash no arthralgias or myalgias  She works as an RN on the floor at [employer]          Medical Diagnoses:   Past Medical History:   Diagnosis Date    Breast mass   benign     bilateral    Other seasonal allergic rhinitis      Problem List[1]    Surgical History:   Past Surgical History:   Procedure Laterality Date    BREAST SURGERY      EXCISION, MASS, BREAST, USING RADIOLOGICAL MARKER Bilateral 12/10/2024    Procedure: EXCISION,MASS,BREAST,USING RADIOLOGICAL MARKER - [facility] Localization // Bilateral need ultrasound, need faxitron, need sentimag;  Surgeon: Elise Fry MD;  Location: [facility];  Service: General;  Laterality: Bilateral;  need ultrasound, need faxitron, need sentimag    no past surgery         Family History:   Family History   Problem Relation Name Age of Onset    No Known Problems Mother      Liver cancer Father Earle Gutiérrez 72    Cancer Father Earle Gutiérrez     Asthma Brother Braulio Gutiérrez     Breast cancer Neg Hx         Social History:   Social History     Tobacco Use    Smoking status: Never     Passive exposure: Never    Smokeless tobacco: Never   Substance Use Topics    Alcohol use: Not Currently       Allergies:  Review of patient's allergies indicates:   Allergen Reactions    Ibuprofen Swelling    Amoxicillin (bulk) Diarrhea       Medications:  Current Medications[2]       Review of Systems:    Constitutional: Denies fever, fatigue, generalized weakness  Skin: Denies wounds, no rashes, no itching, no new skin  "lesions  Respiratory:  Denies cough, shortness of breath, or wheezing  Cardiovascular: Denies chest pain, palpitations, or swelling  Gastrointestional: Denies abdominal pain, nausea, vomiting, diarrhea, or constipation  Genitourinary:  Occasional intermittent flank pain frequency and dysuria  Musculoskeletal: Denies back or flank pain  Neurological: Denies headaches, dizziness, paresthesias, tremors or focal weakness      Vital Signs:  /79 (BP Location: Right arm, Patient Position: Sitting)   Pulse (!) 57   Temp 97.8 °F (36.6 °C) (Oral)   Resp 18   Ht 5' 0.98" (1.549 m)   Wt 44.5 kg (98 lb 3.2 oz)   SpO2 100%   BMI 18.56 kg/m²   Body mass index is 18.56 kg/m².      Physical Exam:    General: no acute distress, awake, alert  Eyes: conjunctiva clear, eyelids without swelling  HENT: atraumatic, oropharynx and nasal mucosa patent  Neck: supple, trache midline, full ROM, no JVD  Respiratory: equal, unlabored, clear to auscultation A/P  Cardiovascular: RRR without murmur or rub  Edema: none  Gastrointestinal: soft, non-tender, non-distended; positive bowel sounds; no masses to palpation; no ascites  Genitourinary: no CVA tenderness upon palpation  Musculoskeletal: ROM without new limitation or discomfort  Integumentary: warm, dry; no rashes, wounds, or skin lesions  Neurological: oriented x4, appropriate, no acute deficits; no asterixis      Labs:  Estimated Creatinine Clearance: 60.9 mL/min (based on SCr of 0.75 mg/dL).         Component Value Date/Time     05/12/2025 0939     03/14/2025 0919    K 3.8 05/12/2025 0939    K 3.8 03/14/2025 0919     05/12/2025 0939     03/14/2025 0919    CO2 27 05/12/2025 0939    CO2 27 03/14/2025 0919    BUN 9.0 (L) 05/12/2025 0939    BUN 12.8 03/14/2025 0919    CREATININE 0.75 05/12/2025 0939    CREATININE 0.75 03/14/2025 0919    CREATININE 0.85 12/09/2024 1314    CREATININE 0.73 01/10/2024 0754    CALCIUM 9.8 05/12/2025 0939    CALCIUM 9.6 03/14/2025 " 0919    PTH 78.5 (H) 05/12/2025 0939           Component Value Date/Time    WBC 4.87 05/12/2025 0939    WBC 4.92 03/14/2025 0919    HGB 15.1 05/12/2025 0939    HGB 13.2 03/14/2025 0919    HCT 46.9 05/12/2025 0939    HCT 39.6 03/14/2025 0919     05/12/2025 0939     03/14/2025 0919         Imaging:  Retroperitoneal US:      Impression:    I do not see any evidence to support chronic kidney disease  Patient is still very concerned        Plan:    Renal ultrasound  Repeat labs and follow-up visit in 3 months just to keep an eye and for close monitoring.  If stable can see periodically or once every couple of years  Chip Loving      This note was created with the assistance of [employer] voice recognition software or phone dictation. There may be transcription errors as a result of using this technology however minimal. Effort has been made to assure accuracy of transcription but any obvious errors or omissions should be clarified with the author of the document.          [1] There is no problem list on file for this patient.  [2]   Current Outpatient Medications:     ascorbic acid (VITAMIN C ORAL), Take by mouth., Disp: , Rfl:     nitrofurantoin, macrocrystal-monohydrate, (MACROBID) 100 MG capsule, Take 1 capsule (100 mg total) by mouth 2 (two) times daily. for 10 days, Disp: 20 capsule, Rfl: 0    atorvastatin (LIPITOR) 40 MG tablet, Take 1 tablet (40 mg total) by mouth once daily. (Patient not taking: Reported on 4/16/2025), Disp: 90 tablet, Rfl: 3    UNABLE TO FIND, medication name: Incentive spirometry (Patient not taking: Reported on 4/16/2025), Disp: , Rfl:

## 2025-05-27 ENCOUNTER — HOSPITAL ENCOUNTER (OUTPATIENT)
Dept: RADIOLOGY | Facility: HOSPITAL | Age: 55
Discharge: HOME | End: 2025-05-27
Attending: INTERNAL MEDICINE
Payer: COMMERCIAL

## 2025-05-27 DIAGNOSIS — R10.9 FLANK PAIN: ICD-10-CM

## 2025-05-27 PROCEDURE — 76770 US EXAM ABDO BACK WALL COMP: CPT | Mod: TC

## 2025-06-04 ENCOUNTER — TELEPHONE (OUTPATIENT)
Dept: FAMILY MEDICINE | Facility: CLINIC | Age: 55
End: 2025-06-04
Payer: COMMERCIAL

## 2025-06-09 ENCOUNTER — RESULTS FOLLOW-UP (OUTPATIENT)
Dept: FAMILY MEDICINE | Facility: CLINIC | Age: 55
End: 2025-06-09

## 2025-06-09 ENCOUNTER — LAB VISIT (OUTPATIENT)
Dept: LAB | Facility: HOSPITAL | Age: 55
End: 2025-06-09
Attending: STUDENT IN AN ORGANIZED HEALTH CARE EDUCATION/TRAINING PROGRAM
Payer: COMMERCIAL

## 2025-06-09 DIAGNOSIS — Z00.00 WELL ADULT HEALTH CHECK: ICD-10-CM

## 2025-06-09 LAB
ALBUMIN SERPL-MCNC: 3.6 G/DL (ref 3.5–5)
ALBUMIN/GLOB SERPL: 1.1 RATIO (ref 1.1–2)
ALP SERPL-CCNC: 70 UNIT/L (ref 40–150)
ALT SERPL-CCNC: 11 UNIT/L (ref 0–55)
ANION GAP SERPL CALC-SCNC: 5 MEQ/L
AST SERPL-CCNC: 15 UNIT/L (ref 11–45)
BASOPHILS # BLD AUTO: 0.08 X10(3)/MCL
BASOPHILS NFR BLD AUTO: 1.7 %
BILIRUB SERPL-MCNC: 0.5 MG/DL
BUN SERPL-MCNC: 8.7 MG/DL (ref 9.8–20.1)
CALCIUM SERPL-MCNC: 9.8 MG/DL (ref 8.4–10.2)
CHLORIDE SERPL-SCNC: 106 MMOL/L (ref 98–107)
CHOLEST SERPL-MCNC: 215 MG/DL
CHOLEST/HDLC SERPL: 4 {RATIO} (ref 0–5)
CO2 SERPL-SCNC: 28 MMOL/L (ref 22–29)
CREAT SERPL-MCNC: 0.75 MG/DL (ref 0.55–1.02)
CREAT/UREA NIT SERPL: 12
EOSINOPHIL # BLD AUTO: 0.35 X10(3)/MCL (ref 0–0.9)
EOSINOPHIL NFR BLD AUTO: 7.4 %
ERYTHROCYTE [DISTWIDTH] IN BLOOD BY AUTOMATED COUNT: 12.4 % (ref 11.5–17)
EST. AVERAGE GLUCOSE BLD GHB EST-MCNC: 114 MG/DL
GFR SERPLBLD CREATININE-BSD FMLA CKD-EPI: >60 ML/MIN/1.73/M2
GLOBULIN SER-MCNC: 3.4 GM/DL (ref 2.4–3.5)
GLUCOSE SERPL-MCNC: 102 MG/DL (ref 74–100)
HBA1C MFR BLD: 5.6 %
HCT VFR BLD AUTO: 42.7 % (ref 37–47)
HDLC SERPL-MCNC: 51 MG/DL (ref 35–60)
HGB BLD-MCNC: 13.9 G/DL (ref 12–16)
IMM GRANULOCYTES # BLD AUTO: 0.01 X10(3)/MCL (ref 0–0.04)
IMM GRANULOCYTES NFR BLD AUTO: 0.2 %
LDLC SERPL CALC-MCNC: 139 MG/DL (ref 50–140)
LYMPHOCYTES # BLD AUTO: 1.54 X10(3)/MCL (ref 0.6–4.6)
LYMPHOCYTES NFR BLD AUTO: 32.6 %
MCH RBC QN AUTO: 29.1 PG (ref 27–31)
MCHC RBC AUTO-ENTMCNC: 32.6 G/DL (ref 33–36)
MCV RBC AUTO: 89.3 FL (ref 80–94)
MONOCYTES # BLD AUTO: 0.32 X10(3)/MCL (ref 0.1–1.3)
MONOCYTES NFR BLD AUTO: 6.8 %
NEUTROPHILS # BLD AUTO: 2.42 X10(3)/MCL (ref 2.1–9.2)
NEUTROPHILS NFR BLD AUTO: 51.3 %
NRBC BLD AUTO-RTO: 0 %
PLATELET # BLD AUTO: 256 X10(3)/MCL (ref 130–400)
PMV BLD AUTO: 9.4 FL (ref 7.4–10.4)
POTASSIUM SERPL-SCNC: 3.9 MMOL/L (ref 3.5–5.1)
PROT SERPL-MCNC: 7 GM/DL (ref 6.4–8.3)
RBC # BLD AUTO: 4.78 X10(6)/MCL (ref 4.2–5.4)
SODIUM SERPL-SCNC: 139 MMOL/L (ref 136–145)
T4 FREE SERPL-MCNC: 1.09 NG/DL (ref 0.7–1.48)
TRIGL SERPL-MCNC: 124 MG/DL (ref 37–140)
TSH SERPL-ACNC: 1.12 UIU/ML (ref 0.35–4.94)
URATE SERPL-MCNC: 5 MG/DL (ref 2.6–6)
VLDLC SERPL CALC-MCNC: 25 MG/DL
WBC # BLD AUTO: 4.72 X10(3)/MCL (ref 4.5–11.5)

## 2025-06-09 PROCEDURE — 84443 ASSAY THYROID STIM HORMONE: CPT

## 2025-06-09 PROCEDURE — 85025 COMPLETE CBC W/AUTO DIFF WBC: CPT

## 2025-06-09 PROCEDURE — 83036 HEMOGLOBIN GLYCOSYLATED A1C: CPT

## 2025-06-09 PROCEDURE — 84439 ASSAY OF FREE THYROXINE: CPT

## 2025-06-09 PROCEDURE — 36415 COLL VENOUS BLD VENIPUNCTURE: CPT

## 2025-06-09 PROCEDURE — 80053 COMPREHEN METABOLIC PANEL: CPT

## 2025-06-09 PROCEDURE — 80061 LIPID PANEL: CPT

## 2025-06-09 PROCEDURE — 84550 ASSAY OF BLOOD/URIC ACID: CPT

## 2025-06-10 ENCOUNTER — OFFICE VISIT (OUTPATIENT)
Dept: FAMILY MEDICINE | Facility: CLINIC | Age: 55
End: 2025-06-10
Payer: COMMERCIAL

## 2025-06-10 VITALS
RESPIRATION RATE: 18 BRPM | HEIGHT: 60 IN | OXYGEN SATURATION: 97 % | TEMPERATURE: 98 F | WEIGHT: 98 LBS | BODY MASS INDEX: 19.24 KG/M2 | HEART RATE: 70 BPM | SYSTOLIC BLOOD PRESSURE: 102 MMHG | DIASTOLIC BLOOD PRESSURE: 68 MMHG

## 2025-06-10 DIAGNOSIS — N32.81 OVERACTIVE BLADDER: ICD-10-CM

## 2025-06-10 DIAGNOSIS — G47.9 DIFFICULTY SLEEPING: ICD-10-CM

## 2025-06-10 DIAGNOSIS — F41.1 GAD (GENERALIZED ANXIETY DISORDER): ICD-10-CM

## 2025-06-10 DIAGNOSIS — N39.0 RECURRENT UTI: ICD-10-CM

## 2025-06-10 DIAGNOSIS — R30.0 DYSURIA: ICD-10-CM

## 2025-06-10 PROCEDURE — 3008F BODY MASS INDEX DOCD: CPT | Mod: CPTII,,, | Performed by: STUDENT IN AN ORGANIZED HEALTH CARE EDUCATION/TRAINING PROGRAM

## 2025-06-10 PROCEDURE — 99214 OFFICE O/P EST MOD 30 MIN: CPT | Mod: ,,, | Performed by: STUDENT IN AN ORGANIZED HEALTH CARE EDUCATION/TRAINING PROGRAM

## 2025-06-10 PROCEDURE — 3074F SYST BP LT 130 MM HG: CPT | Mod: CPTII,,, | Performed by: STUDENT IN AN ORGANIZED HEALTH CARE EDUCATION/TRAINING PROGRAM

## 2025-06-10 PROCEDURE — 3078F DIAST BP <80 MM HG: CPT | Mod: CPTII,,, | Performed by: STUDENT IN AN ORGANIZED HEALTH CARE EDUCATION/TRAINING PROGRAM

## 2025-06-10 PROCEDURE — G2211 COMPLEX E/M VISIT ADD ON: HCPCS | Mod: ,,, | Performed by: STUDENT IN AN ORGANIZED HEALTH CARE EDUCATION/TRAINING PROGRAM

## 2025-06-10 PROCEDURE — 1159F MED LIST DOCD IN RCRD: CPT | Mod: CPTII,,, | Performed by: STUDENT IN AN ORGANIZED HEALTH CARE EDUCATION/TRAINING PROGRAM

## 2025-06-10 PROCEDURE — 1160F RVW MEDS BY RX/DR IN RCRD: CPT | Mod: CPTII,,, | Performed by: STUDENT IN AN ORGANIZED HEALTH CARE EDUCATION/TRAINING PROGRAM

## 2025-06-10 PROCEDURE — 3044F HG A1C LEVEL LT 7.0%: CPT | Mod: CPTII,,, | Performed by: STUDENT IN AN ORGANIZED HEALTH CARE EDUCATION/TRAINING PROGRAM

## 2025-06-10 PROCEDURE — 3066F NEPHROPATHY DOC TX: CPT | Mod: CPTII,,, | Performed by: STUDENT IN AN ORGANIZED HEALTH CARE EDUCATION/TRAINING PROGRAM

## 2025-06-10 RX ORDER — MIRTAZAPINE 7.5 MG/1
7.5 TABLET, FILM COATED ORAL NIGHTLY
Qty: 90 TABLET | Refills: 0 | Status: SHIPPED | OUTPATIENT
Start: 2025-06-10 | End: 2025-09-08

## 2025-06-10 RX ORDER — HYOSCYAMINE SULFATE 0.12 MG/1
0.12 TABLET SUBLINGUAL DAILY PRN
Qty: 30 TABLET | Refills: 0 | Status: SHIPPED | OUTPATIENT
Start: 2025-06-10 | End: 2025-07-10

## 2025-06-10 RX ORDER — NITROFURANTOIN 25; 75 MG/1; MG/1
100 CAPSULE ORAL DAILY
Qty: 90 CAPSULE | Refills: 0 | Status: SHIPPED | OUTPATIENT
Start: 2025-06-10 | End: 2025-06-13 | Stop reason: ALTCHOICE

## 2025-06-10 RX ORDER — NITROFURANTOIN 25; 75 MG/1; MG/1
100 CAPSULE ORAL 2 TIMES DAILY
COMMUNITY
Start: 2025-06-02 | End: 2025-06-10 | Stop reason: SDUPTHER

## 2025-06-10 NOTE — PROGRESS NOTES
Patient ID: [id number]     Chief Complaint: Annual Exam        HPI:      Disclaimer:  This note is prepared using voice recognition software and as such is likely to have errors despite attempts at proofreading. Please contact me for questions.     Dante Bro is a 55 y.o. female here today for the following      Acute Issues-  Patient comes here for multiple issues   Reports that she has a with Urology and was recommended to start Ditropan for overactive bladder.  She was also told that she has stage 3 CKD.  She saw Nephrology.  As of now CKD resolved.  She was too much stressed out during this process and lost weight.  Has been having anxiety.  Worries about her son.  Not getting enough sleep.  Is frustrated because of back to back UTIs.  Has been having dysuria and paid that she has UTI again.  She has a procedure scheduled with Urology in the month of July (not what).  I am hoping it cystoscopy.       Chronic Issues-    -------------------------------------    Breast mass   benign    bilateral    Other seasonal allergic rhinitis        Past Surgical History:   Procedure Laterality Date    BREAST SURGERY      EXCISION, MASS, BREAST, USING RADIOLOGICAL MARKER Bilateral 12/10/2024    Procedure: EXCISION,MASS,BREAST,USING RADIOLOGICAL MARKER - [place] Localization // Bilateral need ultrasound, need faxitron, need sentimag;  Surgeon: Elise Fry MD;  Location: [facility];  Service: General;  Laterality: Bilateral;  need ultrasound, need faxitron, need sentimag    no past surgery         Review of patient's allergies indicates:   Allergen Reactions    Ibuprofen Swelling    Amoxicillin (bulk) Diarrhea       Current Outpatient Medications   Medication Instructions    ascorbic acid (VITAMIN C ORAL) Take by mouth.    atorvastatin (LIPITOR) 40 mg, Oral, Daily    hyoscyamine 0.125 mg, Sublingual, Daily PRN    mirtazapine (REMERON) 7.5 mg, Oral, Nightly    nitrofurantoin, macrocrystal-monohydrate, (MACROBID) 100 MG capsule  100 mg, Oral, Daily    UNABLE TO FIND medication name: Incentive spirometry       Social History[1]     Family History   Problem Relation Name Age of Onset    No Known Problems Mother      Liver cancer Father Earle Gutiérrez 72    Cancer Father Earle Gutiérrez     Asthma Brother Braulio Gutiérrez     Breast cancer Neg Hx          Patient Care Team:  Jeanette Arzola MD as PCP - General (Family Medicine)  Chip Loving MD as Consulting Physician (Nephrology)  Angela Gonzalez ACNP as Nurse Practitioner (Nephrology)  Que Walsh FNP as Nurse Practitioner (Nephrology)  Shelby Pineda DO as Consulting Physician (Nephrology)     Subjective:     ROS    See HPI for details    Constitutional: Denies Change in appetite. Denies Chills. Denies Fever. Denies Night sweats.  Respiratory: Denies Cough. Denies Shortness of breath. Denies Shortness of breath with exertion. Denies Wheezing.  Cardiovascular: DeniesChest pain at rest. Denies Chest pain with exertion. Denies Irregular heartbeat. Denies Palpitations. Denies Edema.  Gastrointestinal: Denies Abdominal pain. DeniesDiarrhea. Denies Nausea. Denies Vomiting. Denies Hematemesis or Hematochezia.  Psychiatric: Denies Depression. Denies Anxiety. Denies Suicidal/Homicidal ideations.    All Other ROS: Negative except as stated in HPI.  Objective:     Visit Vitals  /68 (BP Location: Left arm, Patient Position: Sitting)   Pulse 70   Temp 98.3 °F (36.8 °C) (Oral)   Resp 18   Ht 5' (1.524 m)   Wt 44.5 kg (98 lb)   SpO2 97%   BMI 19.14 kg/m²       Physical Exam    General: Alert and oriented, No acute distress.  Respiratory: Clear to auscultation bilaterally; No wheezes, rales or rhonchi,Non-labored respirations, Symmetrical chest wall expansion.  Cardiovascular: Regular rate and rhythm, S1/S2 normal, No murmurs, rubs or gallops.  Gastrointestinal: Soft, Non-tender, Non-distended, Normal bowel sounds, No palpable organomegaly.  -no CVA  tenderness  Psychiatric:  Anxious  Assessment:       ICD-10-CM ICD-9-CM   1. Dysuria  R30.0 788.1   2. Recurrent UTI  N39.0 599.0   3. Overactive bladder  N32.81 596.51   4. RICHARD (generalized anxiety disorder)  F41.1 300.02   5. Difficulty sleeping  G47.9 780.50        1. Dysuria  Urinalysis and urine culture pending  Would like to send Urogesic blue but not sure about G6PD deficiency   Start hyoscyamine p.r.n. for pain  -     Urinalysis; Future; Expected date: 06/10/2025  -     Urine culture; Future; Expected date: 06/10/2025  -     hyoscyamine 0.125 mg Subl; Place 1 tablet (0.125 mg total) under the tongue daily as needed (pain during urination).  Dispense: 30 tablet; Refill: 0    2. Recurrent UTI  Recommend to start nitrofurantoin prophylactic therapy because of recurrent UTIs  -     nitrofurantoin, macrocrystal-monohydrate, (MACROBID) 100 MG capsule; Take 1 capsule (100 mg total) by mouth Daily.  Dispense: 90 capsule; Refill: 0    3. Overactive bladder  Myrbetriq sample given to the patient   Start Myrbetriq as prescribed    4. RICHARD (generalized anxiety disorder)  Start Remeron for anxiety, decreased appetite and difficulty in sleeping   -     mirtazapine (REMERON) 7.5 MG Tab; Take 1 tablet (7.5 mg total) by mouth every evening.  Dispense: 90 tablet; Refill: 0    5. Difficulty sleeping  - mirtazapine (REMERON) 7.5 MG Tab; Take 1 tablet (7.5 mg total) by mouth every evening.  Dispense: 90 tablet; Refill: 0        Medication List with Changes/Refills   New Medications    HYOSCYAMINE 0.125 MG SUBL    Place 1 tablet (0.125 mg total) under the tongue daily as needed (pain during urination).       Start Date: 6/10/2025 End Date: 7/10/2025    MIRTAZAPINE (REMERON) 7.5 MG TAB    Take 1 tablet (7.5 mg total) by mouth every evening.       Start Date: 6/10/2025 End Date: 9/8/2025   Current Medications    ASCORBIC ACID (VITAMIN C ORAL)    Take by mouth.       Start Date: --        End Date: --    ATORVASTATIN (LIPITOR) 40 MG  TABLET    Take 1 tablet (40 mg total) by mouth once daily.       Start Date: 3/14/2025 End Date: 3/14/2026    UNABLE TO FIND    medication name: Incentive spirometry       Start Date: 12/17/2024End Date: --   Changed and/or Refilled Medications    Modified Medication Previous Medication    NITROFURANTOIN, MACROCRYSTAL-MONOHYDRATE, (MACROBID) 100 MG CAPSULE nitrofurantoin, macrocrystal-monohydrate, (MACROBID) 100 MG capsule       Take 1 capsule (100 mg total) by mouth Daily.    Take 100 mg by mouth 2 (two) times daily.       Start Date: 6/10/2025 End Date: 9/8/2025    Start Date: 6/2/2025  End Date: 6/10/2025          Follow up in about 8 weeks (around 8/5/2025) for Virtual Visit RICHARD.   In addition to their scheduled follow up, the patient has also been instructed to follow up on as needed basis.     Future Appointments   Date Time Provider Department Center   6/19/2025 11:00 AM Elise Fry MD [facility]   [facility]18/2025 10:00 [facility]          [1]   Social History  Socioeconomic History    Marital status:    Tobacco Use    Smoking status: Never     Passive exposure: Never    Smokeless tobacco: Never   Substance and Sexual Activity    Alcohol use: Not Currently    Drug use: Never    Sexual activity: Not Currently     Partners: Male     Birth control/protection: None     Social Drivers of Health     Financial Resource Strain: Low Risk  (4/24/2024)    Overall Financial Resource Strain (CARDIA)     Difficulty of Paying Living Expenses: Not hard at all   Food Insecurity: No Food Insecurity (4/24/2024)    Hunger Vital Sign     Worried About Running Out of Food in the Last Year: Never true     Ran Out of Food in the Last Year: Never true   Transportation Needs: No Transportation Needs (4/24/2024)    PRAPARE - Transportation     Lack of Transportation (Medical): No     Lack of Transportation (Non-Medical): No   Physical Activity: Sufficiently Active  (4/24/2024)    Exercise Vital Sign     Days of Exercise per Week: 7 days     Minutes of Exercise per Session: 60 min   Stress: No Stress Concern Present (4/24/2024)    [employer] [place] [employer] - Occupational Stress Questionnaire     Feeling of Stress : Not at all   Housing Stability: Low Risk  (4/24/2024)    Housing Stability Vital Sign     Unable to Pay for Housing in the Last Year: No     Homeless in the Last Year: No

## 2025-06-11 ENCOUNTER — RESULTS FOLLOW-UP (OUTPATIENT)
Dept: FAMILY MEDICINE | Facility: CLINIC | Age: 55
End: 2025-06-11

## 2025-06-11 LAB
BACTERIA #/AREA URNS AUTO: ABNORMAL /HPF
BILIRUB UR QL STRIP.AUTO: NEGATIVE
CLARITY UR: CLEAR
COLOR UR AUTO: COLORLESS
GLUCOSE UR QL STRIP: NORMAL
HGB UR QL STRIP: NEGATIVE
KETONES UR QL STRIP: NEGATIVE
LEUKOCYTE ESTERASE UR QL STRIP: NEGATIVE
NITRITE UR QL STRIP: NEGATIVE
PH UR STRIP: 6.5 [PH]
PROT UR QL STRIP: NEGATIVE
RBC #/AREA URNS AUTO: ABNORMAL /HPF
SP GR UR STRIP.AUTO: 1 (ref 1–1.03)
SQUAMOUS #/AREA URNS LPF: ABNORMAL /HPF
UROBILINOGEN UR STRIP-ACNC: NORMAL
WBC #/AREA URNS AUTO: ABNORMAL /HPF

## 2025-06-11 PROCEDURE — 87077 CULTURE AEROBIC IDENTIFY: CPT | Performed by: STUDENT IN AN ORGANIZED HEALTH CARE EDUCATION/TRAINING PROGRAM

## 2025-06-11 PROCEDURE — 81001 URINALYSIS AUTO W/SCOPE: CPT | Performed by: STUDENT IN AN ORGANIZED HEALTH CARE EDUCATION/TRAINING PROGRAM

## 2025-06-13 ENCOUNTER — HOSPITAL ENCOUNTER (EMERGENCY)
Facility: HOSPITAL | Age: 55
Discharge: HOME | End: 2025-06-13
Attending: EMERGENCY MEDICINE
Payer: COMMERCIAL

## 2025-06-13 ENCOUNTER — TELEPHONE (OUTPATIENT)
Dept: FAMILY MEDICINE | Facility: CLINIC | Age: 55
End: 2025-06-13
Payer: COMMERCIAL

## 2025-06-13 ENCOUNTER — PATIENT MESSAGE (OUTPATIENT)
Dept: FAMILY MEDICINE | Facility: CLINIC | Age: 55
End: 2025-06-13
Payer: COMMERCIAL

## 2025-06-13 VITALS
HEIGHT: 61 IN | WEIGHT: 99.19 LBS | TEMPERATURE: 98 F | OXYGEN SATURATION: 100 % | BODY MASS INDEX: 18.73 KG/M2 | DIASTOLIC BLOOD PRESSURE: 75 MMHG | SYSTOLIC BLOOD PRESSURE: 116 MMHG | RESPIRATION RATE: 20 BRPM | HEART RATE: 72 BPM

## 2025-06-13 DIAGNOSIS — R78.81 E COLI BACTEREMIA: Primary | ICD-10-CM

## 2025-06-13 DIAGNOSIS — B96.20 E COLI BACTEREMIA: Primary | ICD-10-CM

## 2025-06-13 LAB
ALBUMIN SERPL-MCNC: 3.5 G/DL (ref 3.5–5)
ALBUMIN/GLOB SERPL: 0.8 RATIO (ref 1.1–2)
ALP SERPL-CCNC: 81 UNIT/L (ref 40–150)
ALT SERPL-CCNC: 47 UNIT/L (ref 0–55)
ANION GAP SERPL CALC-SCNC: 6 MEQ/L
AST SERPL-CCNC: 54 UNIT/L (ref 11–45)
BACTERIA #/AREA URNS AUTO: ABNORMAL /HPF
BACTERIA UR CULT: ABNORMAL
BASOPHILS # BLD AUTO: 0.08 X10(3)/MCL
BASOPHILS NFR BLD AUTO: 1.4 %
BILIRUB SERPL-MCNC: 0.2 MG/DL
BILIRUB UR QL STRIP.AUTO: NEGATIVE
BUN SERPL-MCNC: 14.7 MG/DL (ref 9.8–20.1)
CALCIUM SERPL-MCNC: 9.4 MG/DL (ref 8.4–10.2)
CHLORIDE SERPL-SCNC: 106 MMOL/L (ref 98–107)
CLARITY UR: CLEAR
CO2 SERPL-SCNC: 27 MMOL/L (ref 22–29)
COLOR UR AUTO: COLORLESS
CREAT SERPL-MCNC: 0.71 MG/DL (ref 0.55–1.02)
CREAT/UREA NIT SERPL: 21
EOSINOPHIL # BLD AUTO: 0.36 X10(3)/MCL (ref 0–0.9)
EOSINOPHIL NFR BLD AUTO: 6.2 %
ERYTHROCYTE [DISTWIDTH] IN BLOOD BY AUTOMATED COUNT: 12.1 % (ref 11.5–17)
GFR SERPLBLD CREATININE-BSD FMLA CKD-EPI: >60 ML/MIN/1.73/M2
GLOBULIN SER-MCNC: 4.2 GM/DL (ref 2.4–3.5)
GLUCOSE SERPL-MCNC: 91 MG/DL (ref 74–100)
GLUCOSE UR QL STRIP: NORMAL
HCT VFR BLD AUTO: 42.6 % (ref 37–47)
HGB BLD-MCNC: 14 G/DL (ref 12–16)
HGB UR QL STRIP: NEGATIVE
HOLD SPECIMEN: NORMAL
HYALINE CASTS #/AREA URNS LPF: ABNORMAL /LPF
IMM GRANULOCYTES # BLD AUTO: 0.01 X10(3)/MCL (ref 0–0.04)
IMM GRANULOCYTES NFR BLD AUTO: 0.2 %
KETONES UR QL STRIP: NEGATIVE
LEUKOCYTE ESTERASE UR QL STRIP: NEGATIVE
LYMPHOCYTES # BLD AUTO: 1.44 X10(3)/MCL (ref 0.6–4.6)
LYMPHOCYTES NFR BLD AUTO: 24.9 %
MCH RBC QN AUTO: 29.6 PG (ref 27–31)
MCHC RBC AUTO-ENTMCNC: 32.9 G/DL (ref 33–36)
MCV RBC AUTO: 90.1 FL (ref 80–94)
MONOCYTES # BLD AUTO: 0.48 X10(3)/MCL (ref 0.1–1.3)
MONOCYTES NFR BLD AUTO: 8.3 %
NEUTROPHILS # BLD AUTO: 3.42 X10(3)/MCL (ref 2.1–9.2)
NEUTROPHILS NFR BLD AUTO: 59 %
NITRITE UR QL STRIP: NEGATIVE
NRBC BLD AUTO-RTO: 0 %
PH UR STRIP: 6 [PH]
PLATELET # BLD AUTO: 263 X10(3)/MCL (ref 130–400)
PMV BLD AUTO: 9.3 FL (ref 7.4–10.4)
POTASSIUM SERPL-SCNC: 3.8 MMOL/L (ref 3.5–5.1)
PROT SERPL-MCNC: 7.7 GM/DL (ref 6.4–8.3)
PROT UR QL STRIP: NEGATIVE
RBC # BLD AUTO: 4.73 X10(6)/MCL (ref 4.2–5.4)
RBC #/AREA URNS AUTO: ABNORMAL /HPF
SODIUM SERPL-SCNC: 139 MMOL/L (ref 136–145)
SP GR UR STRIP.AUTO: <1.005 (ref 1–1.03)
SQUAMOUS #/AREA URNS LPF: ABNORMAL /HPF
UROBILINOGEN UR STRIP-ACNC: NORMAL
WBC # BLD AUTO: 5.79 X10(3)/MCL (ref 4.5–11.5)
WBC #/AREA URNS AUTO: ABNORMAL /HPF

## 2025-06-13 PROCEDURE — 80053 COMPREHEN METABOLIC PANEL: CPT | Performed by: NURSE PRACTITIONER

## 2025-06-13 PROCEDURE — 85025 COMPLETE CBC W/AUTO DIFF WBC: CPT | Performed by: NURSE PRACTITIONER

## 2025-06-13 PROCEDURE — 99283 EMERGENCY DEPT VISIT LOW MDM: CPT

## 2025-06-13 PROCEDURE — 81015 MICROSCOPIC EXAM OF URINE: CPT | Performed by: NURSE PRACTITIONER

## 2025-06-13 RX ORDER — SULFAMETHOXAZOLE AND TRIMETHOPRIM 800; 160 MG/1; MG/1
1 TABLET ORAL 2 TIMES DAILY
Qty: 28 TABLET | Refills: 0 | Status: SHIPPED | OUTPATIENT
Start: 2025-06-13 | End: 2025-06-27

## 2025-06-13 NOTE — ED PROVIDER NOTES
"Encounter Date: 6/13/2025       History     Chief Complaint   Patient presents with    Dysuria     "I have frequent UTI, and my doctor sent me here to get IV antibiotics. I have frequent urination and a burning sensation." Symptoms intermittent x1 month.      Patient is a 55-year-old female who presents for evaluation after seeing me a call from her primary care doctor.  The patient reports having a chronic bladder infection and is currently being treated by both her primary care doctor as well as a urologist for issue.  States she provided a urine recently with a culture that displayed a result with resistance to her current oral antibiotic.  For his pain instructed by her PCP to come in to the emergency room for "IV antibiotics." The patient reports mild urinary frequency and dysuria.  Denies chest pain, shortness of breath, fever, abdominal pain, or loss of bowel or bladder control.      Review of patient's allergies indicates:   Allergen Reactions    Ibuprofen Swelling    Amoxicillin (bulk) Diarrhea     Past Medical History:   Diagnosis Date    Breast mass   benign     bilateral    Other seasonal allergic rhinitis      Past Surgical History:   Procedure Laterality Date    BREAST SURGERY      EXCISION, MASS, BREAST, USING RADIOLOGICAL MARKER Bilateral 12/10/2024    Procedure: EXCISION,MASS,BREAST,USING RADIOLOGICAL MARKER - [place] Localization // Bilateral need ultrasound, need faxitron, need sentimag;  Surgeon: Elise Fry MD;  Location: [facility] OR;  Service: General;  Laterality: Bilateral;  need ultrasound, need faxitron, need sentimag    no past surgery       Family History   Problem Relation Name Age of Onset    No Known Problems Mother      Liver cancer Father Earle Cifuentesrussellos 72    Cancer Father Earle Marla     Asthma Brother Braulio Cifuentesnorris     Breast cancer Neg Hx       Social History[1]  Review of Systems   Constitutional:  Negative for chills, diaphoresis, fatigue and fever.   HENT:  " Negative for facial swelling, rhinorrhea, sinus pressure, sinus pain, sore throat and trouble swallowing.    Respiratory:  Negative for cough, chest tightness, shortness of breath and wheezing.    Cardiovascular:  Negative for chest pain, palpitations and leg swelling.   Gastrointestinal:  Negative for abdominal pain, diarrhea, nausea and vomiting.   Genitourinary:  Positive for frequency and urgency. Negative for dysuria, flank pain and hematuria.   Musculoskeletal:  Negative for arthralgias, back pain, joint swelling and myalgias.   Skin:  Negative for color change and rash.   Neurological:  Negative for dizziness, syncope, weakness and light-headedness.   Hematological:  Does not bruise/bleed easily.   All other systems reviewed and are negative.      Physical Exam     Initial Vitals [06/13/25 1546]   BP Pulse Resp Temp SpO2   116/75 72 20 97.9 °F (36.6 °C) 100 %      MAP       --         Physical Exam    Nursing note and vitals reviewed.  Constitutional: She appears well-developed and well-nourished.   HENT:   Head: Normocephalic and atraumatic.   Nose: Nose normal. Mouth/Throat: Oropharynx is clear and moist.   Eyes: Conjunctivae and EOM are normal. Pupils are equal, round, and reactive to light.   Neck: Neck supple.   Normal range of motion.  Cardiovascular:  Normal rate, regular rhythm, normal heart sounds and intact distal pulses.           Pulmonary/Chest: Effort normal and breath sounds normal. No respiratory distress. She has no wheezes. She has no rhonchi. She has no rales. She exhibits no tenderness.   Abdominal: Abdomen is soft and flat. Bowel sounds are normal. She exhibits no distension. There is no abdominal tenderness. There is no rebound, no guarding, no tenderness at McBurney's point and negative Beltran's sign. negative psoas sign  Musculoskeletal:         General: Normal range of motion.      Cervical back: Normal range of motion and neck supple.     Neurological: She is alert and oriented to  person, place, and time. She has normal strength and normal reflexes.   Skin: Skin is warm and dry. Capillary refill takes less than 2 seconds.   Psychiatric: She has a normal mood and affect. Her speech is normal and behavior is normal. Judgment and thought content normal.         ED Course   Procedures  Labs Reviewed   URINALYSIS, REFLEX TO URINE CULTURE - Abnormal       Result Value    Color, UA Colorless (*)     Appearance, UA Clear      Specific Gravity, UA <1.005 (*)     pH, UA 6.0      Protein, UA Negative      Glucose, UA Normal      Ketones, UA Negative      Blood, UA Negative      Bilirubin, UA Negative      Urobilinogen, UA Normal      Nitrites, UA Negative      Leukocyte Esterase, UA Negative      RBC, UA 0-5      WBC, UA 0-5      Bacteria, UA Trace (*)     Squamous Epithelial Cells, UA None Seen      Hyaline Casts, UA None Seen     COMPREHENSIVE METABOLIC PANEL - Abnormal    Sodium 139      Potassium 3.8      Chloride 106      CO2 27      Glucose 91      Blood Urea Nitrogen 14.7      Creatinine 0.71      Calcium 9.4      Protein Total 7.7      Albumin 3.5      Globulin 4.2 (*)     Albumin/Globulin Ratio 0.8 (*)     Bilirubin Total 0.2      ALP 81      ALT 47      AST 54 (*)     eGFR >60      Anion Gap 6.0      BUN/Creatinine Ratio 21     CBC WITH DIFFERENTIAL - Abnormal    WBC 5.79      RBC 4.73      Hgb 14.0      Hct 42.6      MCV 90.1      MCH 29.6      MCHC 32.9 (*)     RDW 12.1      Platelet 263      MPV 9.3      Neut % 59.0      Lymph % 24.9      Mono % 8.3      Eos % 6.2      Basophil % 1.4      Imm Grans % 0.2      Neut # 3.42      Lymph # 1.44      Mono # 0.48      Eos # 0.36      Baso # 0.08      Imm Gran # 0.01      NRBC% 0.0     CBC W/ AUTO DIFFERENTIAL    Narrative:     The following orders were created for panel order CBC auto differential.  Procedure                               Abnormality         Status                     ---------                               -----------          ------                     CBC with Differential[[id number]]       Abnormal            Final result                 Please view results for these tests on the individual orders.   EXTRA TUBES    Narrative:     The following orders were created for panel order EXTRA TUBES.  Procedure                               Abnormality         Status                     ---------                               -----------         ------                     Light Blue Top Hold[[id number]]                             In process                 Light Green Top Hold[[id number]]                            In process                 Lavender Top Hold[[id number]]                               In process                 Gold Top Hold[[id number]]                                   In process                 [place] Top Hold[[id number]]                                   In process                   Please view results for these tests on the individual orders.   LIGHT BLUE TOP HOLD   LIGHT GREEN TOP HOLD   LAVENDER TOP HOLD   GOLD TOP HOLD   GREY TOP HOLD          Imaging Results    None          Medications - No data to display  Medical Decision Making  Differential:   Cystitis   Interstitial cystitis   Abnormal diagnostic testing    Amount and/or Complexity of Data Reviewed  Labs: ordered.    Risk  Prescription drug management.               ED Course as of 06/13/25 1726   Fri Jun 13, 2025   1721 I have discussed patient's status including urine culture results with Dr. Reynaga, ED attending.  On review of the patient's symptoms, physician denies need for IV ABX at this time for patient as she is displaying no acute indication of pyelonephritis or systemic infection.  Physician recommends placing the patient on Bactrim DS as her e coli infection appears to be sensitive to it as evidenced by her urine culture.  The patient will stop taking Macrobid at this time.  The patient will follow up with the primary care doctor this week for any  additional needs or concerns.  Strict follow up and return precautions discussed with the patient has verbalized understanding and agreed with.  The patient will be discharged at this time. [JA]      ED Course User Index  [JA] Jonathan Small Jr., DNP                       This chart is generated using a voice recognition system. Grammatical and content areas may inadvertently be generated in error. Please contact me if you find a perceive any inappropriate information in this chart. Thank you.       Clinical Impression:  Final diagnoses:  [R78.81, B96.20] E coli bacteremia (Primary)          ED Disposition Condition    Discharge Stable          ED Prescriptions       Medication Sig Dispense Start Date End Date Auth. Provider    sulfamethoxazole-trimethoprim 800-160mg (BACTRIM DS) 800-160 mg Tab Take 1 tablet by mouth 2 (two) times daily. for 14 days 28 tablet 6/13/2025 6/27/2025 Jonathan Small Jr., DNP          Follow-up Information       Follow up With Specialties Details Why Contact Info    Jeanette Arzola MD Family Medicine In 3 days  [address] [facility] [address]  [facility] [address] [phone]      [facility] Emergency Medicine In 3 days As needed, If symptoms worsen [address] [facility] [address] [phone]                   [1]   Social History  Tobacco Use    Smoking status: Never     Passive exposure: Never    Smokeless tobacco: Never   Substance Use Topics    Alcohol use: Not Currently    Drug use: Never        Jonathan Small Jr., DNP  06/13/25 5366

## 2025-06-13 NOTE — TELEPHONE ENCOUNTER
----- Message from Jeanette Arzola MD sent at 6/13/2025  9:36 AM CDT -----  I tried calling the patient x3 but she missed the call every time.  She does not voice box.  I also called her daughter's number but she did not  her phone.     Can you please make sure that the patient picks up her phone so that you can convey the message to her  Patient has E coli UTI  Nitrofurantoin is not that is sensitive  All the medications in the sensitivity list are IV  She has to go to ER in order to get IV therapy to eradicate this E coli as she is having recurrent UTIs    Thanks,    Dr. Arzola  ----- Message -----  From: Lab, Background User  Sent: 6/11/2025  12:14 PM CDT  To: Jeanette Arzola MD

## 2025-06-18 ENCOUNTER — HOSPITAL ENCOUNTER (OUTPATIENT)
Dept: RADIOLOGY | Facility: HOSPITAL | Age: 55
Discharge: HOME | End: 2025-06-18
Attending: STUDENT IN AN ORGANIZED HEALTH CARE EDUCATION/TRAINING PROGRAM
Payer: COMMERCIAL

## 2025-06-18 DIAGNOSIS — Z12.31 BREAST CANCER SCREENING BY MAMMOGRAM: ICD-10-CM

## 2025-06-18 PROCEDURE — 77063 BREAST TOMOSYNTHESIS BI: CPT | Mod: 26,,, | Performed by: RADIOLOGY

## 2025-06-18 PROCEDURE — 77067 SCR MAMMO BI INCL CAD: CPT | Mod: 26,,, | Performed by: RADIOLOGY

## 2025-06-18 PROCEDURE — 77067 SCR MAMMO BI INCL CAD: CPT | Mod: TC

## 2025-06-19 ENCOUNTER — OFFICE VISIT (OUTPATIENT)
Dept: SURGERY | Facility: CLINIC | Age: 55
End: 2025-06-19
Payer: COMMERCIAL

## 2025-06-19 VITALS
DIASTOLIC BLOOD PRESSURE: 61 MMHG | HEART RATE: 66 BPM | TEMPERATURE: 98 F | RESPIRATION RATE: 18 BRPM | BODY MASS INDEX: 18.54 KG/M2 | OXYGEN SATURATION: 98 % | WEIGHT: 98.19 LBS | HEIGHT: 61 IN | SYSTOLIC BLOOD PRESSURE: 97 MMHG

## 2025-06-19 DIAGNOSIS — Z12.39 BREAST CANCER SCREENING, HIGH RISK PATIENT: Primary | ICD-10-CM

## 2025-06-19 DIAGNOSIS — Z91.89 AT HIGH RISK FOR BREAST CANCER: ICD-10-CM

## 2025-06-19 DIAGNOSIS — Z12.31 VISIT FOR SCREENING MAMMOGRAM: ICD-10-CM

## 2025-06-19 PROCEDURE — 1159F MED LIST DOCD IN RCRD: CPT | Mod: CPTII,S$GLB,, | Performed by: STUDENT IN AN ORGANIZED HEALTH CARE EDUCATION/TRAINING PROGRAM

## 2025-06-19 PROCEDURE — 99214 OFFICE O/P EST MOD 30 MIN: CPT | Mod: S$GLB,,, | Performed by: STUDENT IN AN ORGANIZED HEALTH CARE EDUCATION/TRAINING PROGRAM

## 2025-06-19 PROCEDURE — 3008F BODY MASS INDEX DOCD: CPT | Mod: CPTII,S$GLB,, | Performed by: STUDENT IN AN ORGANIZED HEALTH CARE EDUCATION/TRAINING PROGRAM

## 2025-06-19 PROCEDURE — 3066F NEPHROPATHY DOC TX: CPT | Mod: CPTII,S$GLB,, | Performed by: STUDENT IN AN ORGANIZED HEALTH CARE EDUCATION/TRAINING PROGRAM

## 2025-06-19 PROCEDURE — 3074F SYST BP LT 130 MM HG: CPT | Mod: CPTII,S$GLB,, | Performed by: STUDENT IN AN ORGANIZED HEALTH CARE EDUCATION/TRAINING PROGRAM

## 2025-06-19 PROCEDURE — 3044F HG A1C LEVEL LT 7.0%: CPT | Mod: CPTII,S$GLB,, | Performed by: STUDENT IN AN ORGANIZED HEALTH CARE EDUCATION/TRAINING PROGRAM

## 2025-06-19 PROCEDURE — 99999 PR PBB SHADOW E&M-EST. PATIENT-LVL IV: CPT | Mod: PBBFAC,,, | Performed by: STUDENT IN AN ORGANIZED HEALTH CARE EDUCATION/TRAINING PROGRAM

## 2025-06-19 PROCEDURE — 1160F RVW MEDS BY RX/DR IN RCRD: CPT | Mod: CPTII,S$GLB,, | Performed by: STUDENT IN AN ORGANIZED HEALTH CARE EDUCATION/TRAINING PROGRAM

## 2025-06-19 PROCEDURE — 3078F DIAST BP <80 MM HG: CPT | Mod: CPTII,S$GLB,, | Performed by: STUDENT IN AN ORGANIZED HEALTH CARE EDUCATION/TRAINING PROGRAM

## 2025-06-19 RX ORDER — ESTRADIOL 0.1 MG/G
CREAM VAGINAL
COMMUNITY
Start: 2025-03-13

## 2025-06-19 NOTE — PROGRESS NOTES
Erroneous encounter   [facility] Breast Surg  Breast Surgical Oncology  Established Patient Office Visit - H&P      Care Team: Patient Care Team:  Jeanette Arzola MD as PCP - General (Family Medicine)  Chip Loving MD as Consulting Physician (Nephrology)  Angela Gonzalez ACNP as Nurse Practitioner (Nephrology)  Que Walsh FNP as Nurse Practitioner (Nephrology)  Shelby Pineda DO as Consulting Physician (Nephrology)  Elise Fry MD as Consulting Physician (Breast Surgery)   Referring Provider: No ref. provider found    Chief Complaint:   Chief Complaint   Patient presents with    Follow-up     Patient denies any breast related concerns on today          Subjective:    Treatments:  BL excisional biopsy 12/10/24: Left breast IP w atypia. Right breast - IP.     Interval History:  06/27/2025 - Dante Bro returns today for follow up. She denies any current breast complaints.  She recently had follow up imaging which did not show any suspicious findings.    History of Present Illness:  Dante Bro is a pleasant 55 y.o.female who presents as a referral from Jeanette Arzola MD for BL breast papillomas. She recently had four biopsies (2 in each breast) and she has an atypical papilloma in the left breast and a papilloma without atypia in the right breast.  One of the biopsies in her right breast she developed swelling about the size of a tomato which has decreased in size.  And she is still relatively sore in that area.  She has never had any breast surgeries or biopsies prior to this. She did have R nipple discharge about 4 years ago that was clear or brown but resolved and she denies any current concerns with her breasts, prior to her biopsy.   No family history of breast cancer or ovarian cancer.  She has 2 children.  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     Lifetime risk of breast cancer calculated 29%.     We did discuss chemoprevention with her history of atypia  and she was not interested.    Imaging:   Bilateral diagnostic mammogram 08/19/2024:  Density C, BI-RADS 4.  Right breast:  1:00 a.m. axis posterior depth there grouped coarse heterogeneous calcifications that are suspicious.  Left breast: 11-1 o'clock axis posterior to anterior depth there heterogeneous coarse calcifications that are suspicious.  In the 9:00 left breast subareolar there is a oval mass.  Ultrasound:  Right breast at 12-1 o'clock, 1 cm from the nipple there is a 0.7 x 0.5 x 0.7 cm vascular intraductal mass.  This correlates with the patient's history of right nipple discharge in his suspicious.  In the left breast 9:00 axis subareolar position there is a 1 x 0.5 x 0.9 cm oval mass with indistinct margins.  In the left breast 11:00 axis there is mild duct ectasia with echogenic intraductal foci compatible with calcifications and this correlates with the calcifications seen on mammogram in the 11-1 o'clock axis.  Biopsy bilateral breast masses 09/26/2024:  Site #1: Left breast 11:00-1:00 axes posterior to anterior depths segmental coarse heterogeneous calcifications ; T3-coil clip  Site #2: Right breast 1:00 axis posterior depth grouped coarse heterogeneous calcifications ; T3-coil clip  Site #3: Left breast 9:00 axis subareolar mass ; T4-butterfly clip  Site #4: Right breast 12:30 axis 1 cm FN intraductal mass ; T1-barrel clip  MRI breast 11/05/2024: BIRADS 2.   Right breast:   In the central region anterior depth, there is postsurgical change and a metallic clip (T1-barrel) related to recent ultrasound-guided core needle biopsy of an intraductal papilloma.  There is no enhancement unique to the BPE at or around this core needle biopsy site.  In the 1:00 axis posterior depth, there is postsurgical change and a metallic clip (T3-coil) related to recent benign stereotactic guided core needle biopsy of calcifications.  There is no enhancement unique to the BPE at or around this core needle biopsy site.    Left breast:  In the 12:00 axis middle depth, there is postsurgical change and a metallic clip (T3-coil) related to recent benign stereotactic guided core needle biopsy of calcifications.  There is no enhancement unique to the BPE at or around this core needle biopsy site.  In the 9:00 axis central region middle depth, there is postsurgical change and a metallic clip (T4-butterfly) related to recent high-risk benign ultrasound-guided core needle biopsy of an atypical papilloma.  There is no enhancement unique to the BPE at or around this core needle biopsy site.  Bilateral screening mammogram 06/18/2025:  Density C.  Interval benign postsurgical changes present bilaterally.  No significant interval change.  BI-RADS 2.    I have reviewed the imaging and agree with the radiologists interpretation. I have discussed these results with the patient.      Pathology:   CORE BREAST BIOPSIES 9/26/24:  Site #1: Left breast 11:00-1:00 axes posterior to anterior depths calcifications ; T3-coil clip:  Florid ductal hyperplasia of the usual type, focal.  Mammary duct ectasia.  Fibroadenomatoid change with microcalcifications.  Papillary lesion, fragmented and minimally represented, not further classified.  Site #2: Right breast 1:00 axis posterior depth calcifications ; T3-coil clip:  Sclerosing adenosis with the associated abundant microcalcifications, no evidence of malignancy.  Columnar cell changes.  Fibroadenoma.  Left breast, subareolar mass at 9:00 core biopsy:  Intraductal papilloma with superimposed florid epithelial hyperplasia, atypical ductal hyperplasia and apocrine metaplasia, atypical papilloma.  Right breast mass at 12:30, 1 cm from nipple core biopsy:  Intraductal papilloma with superimposed florid epithelial hyperplasia of the usual type and apocrine metaplasia.  Bilateral excisional biopsies 12/10/2024:  Right excisional biopsy: Intraductal papilloma, fibroadenoma, proliferative and nonproliferative fibrocystic  changes.  Site of prior biopsy.  Calcifications with a benign breast elements.  Left excisional biopsy: Atypical ductal hyperplasia.  Intraductal papillomas.  Proliferative and nonproliferative fibrocystic changes.  Left lateral margin: Intraductal papilloma.  Ectatic ducts.  Calcifications with a benign breast elements.       OB / GYN History   Menarche Onset:12  Menopause: Menopause: perimenopausal  Hormonal birth control (duration): 0years  Pregnancies: 2  Age at first child birth: 33  Child births: 2  Hysterectomy/Oophorectomy: no  HRT: no    Family History of Cancer (& age at diagnosis):  -   Family History   Problem Relation Name Age of Onset    No Known Problems Mother      Liver cancer Father Earle Gutiérrez 72    Cancer Father Earle Gutiérrez     Asthma Brother Braulio Gutiérrez     Breast cancer Neg Hx          Lifestyle:  Height and Weight:   BMI: Body mass index is 18.55 kg/m².     Other:  MG breast density: C  Prior thoracic RT: none  Genetic testing: No  Ashkenazi Rastafarian descent: No    Other History:     Past Medical History:   Diagnosis Date    Breast mass   benign     bilateral    Other seasonal allergic rhinitis         Past Surgical History:   Procedure Laterality Date    BREAST SURGERY      EXCISION, MASS, BREAST, USING RADIOLOGICAL MARKER Bilateral 12/10/2024    Procedure: EXCISION,MASS,BREAST,USING RADIOLOGICAL MARKER - [place] Localization // Bilateral need ultrasound, need faxitron, need sentimag;  Surgeon: Elise Fry MD;  Location: [facility] OR;  Service: General;  Laterality: Bilateral;  need ultrasound, need faxitron, need sentimag    no past surgery          Social History     Socioeconomic History    Marital status:    Tobacco Use    Smoking status: Never     Passive exposure: Never    Smokeless tobacco: Never   Substance and Sexual Activity    Alcohol use: Never    Drug use: Never    Sexual activity: Not Currently     Partners: Male     Birth control/protection: None      Social Drivers of Health     Financial Resource Strain: Low Risk  (4/24/2024)    Overall Financial Resource Strain (CARDIA)     Difficulty of Paying Living Expenses: Not hard at all   Food Insecurity: No Food Insecurity (4/24/2024)    Hunger Vital Sign     Worried About Running Out of Food in the Last Year: Never true     Ran Out of Food in the Last Year: Never true   Transportation Needs: No Transportation Needs (4/24/2024)    PRAPARE - Transportation     Lack of Transportation (Medical): No     Lack of Transportation (Non-Medical): No   Physical Activity: Sufficiently Active (4/24/2024)    Exercise Vital Sign     Days of Exercise per Week: 7 days     Minutes of Exercise per Session: 60 min   Stress: No Stress Concern Present (4/24/2024)    [employer] [place] [employer] - Occupational Stress Questionnaire     Feeling of Stress : Not at all   Housing Stability: Low Risk  (4/24/2024)    Housing Stability Vital Sign     Unable to Pay for Housing in the Last Year: No     Homeless in the Last Year: No        Immunization History   Administered Date(s) Administered    COVID-19, MRNA, LN-S, PF (Pfizer) (Purple Cap) 09/10/2021, 10/01/2021    Influenza - Quadrivalent - PF *Preferred* (6 months and older) 11/29/2022, 11/16/2023    Influenza - Trivalent - Fluarix, Flulaval, Fluzone, Afluria - PF 11/15/2024        Medications/Allergies:       Current Outpatient Medications   Medication Instructions    ascorbic acid (VITAMIN C ORAL) Take by mouth.    atorvastatin (LIPITOR) 40 mg, Oral, Daily    estradioL (ESTRACE) 0.01 % (0.1 mg/gram) vaginal cream Place vaginally.    hyoscyamine 0.125 mg, Sublingual, Daily PRN    mirtazapine (REMERON) 7.5 mg, Oral, Nightly    sulfamethoxazole-trimethoprim 800-160mg (BACTRIM DS) 800-160 mg Tab 1 tablet, Oral, 2 times daily    UNABLE TO FIND medication name: Incentive spirometry       Review of patient's allergies indicates:   Allergen Reactions    Ibuprofen Swelling     "Amoxicillin (bulk) Diarrhea        Review of Systems:        See HPI for pertinent ROS        Objective/Physical Exam   Visit Vitals  BP 97/61 (BP Location: Left arm, Patient Position: Sitting)   Pulse 66   Temp 98.2 °F (36.8 °C) (Oral)   Resp 18   Ht 5' 1" (1.549 m)   Wt 44.5 kg (98 lb 3.2 oz)   LMP  (LMP Unknown)   SpO2 98%   BMI 18.55 kg/m²          Physical Exam    General: The patient is awake, alert and oriented times three. The patient is well nourished. No acute distress.  Neck: The neck is supple.   Musculoskeletal: The patient has a normal range of motion of her bilateral upper extremities.  Heart: Regular rate and rhythm, no murmurs.   Lung: No increased work of breathing. Clear breath sounds bilaterally.  Lymph nodes: There is no axillary, supraclavicular or cervical lymphadenopathy.  Breast:  Right:   On inspection there is no skin dimpling, rashes, retraction, erythema or skin abnormalities.  Well-healed periareolar incision.  The nipple areolar complex is normal with an everted nipple. The breasts move normally with movement of the pectoralis muscle. On palpation there are no dominant masses.  There is no tenderness. There is no nipple discharge.  Left:   On inspection there is no skin dimpling, rashes, retraction, erythema or skin abnormalities.  Well-healed crystal areolar incision.  The nipple areolar complex is normal with an everted nipple. The breasts move normally with movement of the pectoralis muscle. On palpation there are no dominant masses.  There is no tenderness. There is no nipple discharge.      Assessment and Plan     1. Breast cancer screening, high risk patient  - MRI Screening Breast W/WO Contrast, W/CAD, Panfilo; Future    2. At high risk for breast cancer  - MRI Screening Breast W/WO Contrast, W/CAD, Panfilo; Future    3. Visit for screening mammogram  - Mammo Digital Screening Bilat w/ Abel (XPD); Future      Dante Bro is a 55 y.o.female who is here today for high risk follow up.  There " are no concerning findings on her breast exam or on recent imaging.  Her lifetime risk of breast cancer is about 29%.  She understands that >20% is considered high risk.  Today we again discussed risk factors associated with the development of breast cancer and risk reduction strategies.       PLAN:     Lifestyle - Healthy lifestyle guidelines were reviewed. She was encouraged to engage in regular exercise, maintain a healthy body weight, and avoid excessive alcohol consumption. Healthy nutritional guidelines were also discussed.     Surveillance - She desires undergoing high risk screening with annual screening mammograms and breast MRIs. In the absence of significant clinical findings in the interval, I recommend MRI due in December of 2025 and bilateral screening mammogram we do in June of 2026.    Recommend continuing with 2 clinical breast exams/year, one with your OBGYN and one with a breast Center.     Chemoprevention has been previously discussed with her and she is not interested.     The patient was encouraged to continue her self breast exam. If she notes any changes or has any concerns with her breast in the interim she was encouraged to call the breast center to schedule an appointment as soon as possible. All of her questions were answered.     Elise Fry MD   Breast Surgical Oncology     I spent a total of 30 minutes on the day of the visit.  This includes face to face time and non-face to face time preparing to see the patient (eg, review of tests), obtaining and/or reviewing separately obtained history, documenting clinical information in the electronic or other health record, independently interpreting results and communicating results to the patient/family/caregiver, or care coordinator.

## 2025-07-10 ENCOUNTER — PATIENT OUTREACH (OUTPATIENT)
Facility: CLINIC | Age: 55
End: 2025-07-10
Payer: COMMERCIAL

## 2025-07-10 NOTE — PROGRESS NOTES
HEP C , HIV uploaded from [employer] portal and hyper linked in HM,  PAP found in Care Everywhere uploaded and  hyper linked to HM

## 2025-08-11 ENCOUNTER — PATIENT MESSAGE (OUTPATIENT)
Dept: NEPHROLOGY | Facility: CLINIC | Age: 55
End: 2025-08-11
Payer: COMMERCIAL

## 2025-08-25 ENCOUNTER — OFFICE VISIT (OUTPATIENT)
Dept: URGENT CARE | Facility: CLINIC | Age: 55
End: 2025-08-25
Payer: COMMERCIAL

## 2025-08-25 VITALS
TEMPERATURE: 98 F | HEART RATE: 71 BPM | BODY MASS INDEX: 18.5 KG/M2 | DIASTOLIC BLOOD PRESSURE: 72 MMHG | RESPIRATION RATE: 18 BRPM | OXYGEN SATURATION: 97 % | SYSTOLIC BLOOD PRESSURE: 109 MMHG | HEIGHT: 61 IN | WEIGHT: 98 LBS

## 2025-08-25 DIAGNOSIS — R30.0 DYSURIA: ICD-10-CM

## 2025-08-25 DIAGNOSIS — N30.00 ACUTE CYSTITIS WITHOUT HEMATURIA: Primary | ICD-10-CM

## 2025-08-25 LAB
BILIRUB UR QL STRIP: NEGATIVE
GLUCOSE UR QL STRIP: NEGATIVE
KETONES UR QL STRIP: NEGATIVE
LEUKOCYTE ESTERASE UR QL STRIP: POSITIVE
PH, POC UA: 6.5
POC BLOOD, URINE: NEGATIVE
POC NITRATES, URINE: POSITIVE
PROT UR QL STRIP: NEGATIVE
SP GR UR STRIP: 1.01 (ref 1–1.03)
UROBILINOGEN UR STRIP-ACNC: ABNORMAL (ref 0.1–1.1)

## 2025-08-25 PROCEDURE — 81003 URINALYSIS AUTO W/O SCOPE: CPT | Mod: QW,,, | Performed by: FAMILY MEDICINE

## 2025-08-25 PROCEDURE — 87077 CULTURE AEROBIC IDENTIFY: CPT | Performed by: FAMILY MEDICINE

## 2025-08-25 PROCEDURE — 99213 OFFICE O/P EST LOW 20 MIN: CPT | Mod: ,,, | Performed by: FAMILY MEDICINE

## 2025-08-25 RX ORDER — SULFAMETHOXAZOLE AND TRIMETHOPRIM 800; 160 MG/1; MG/1
1 TABLET ORAL 2 TIMES DAILY
Qty: 14 TABLET | Refills: 0 | Status: SHIPPED | OUTPATIENT
Start: 2025-08-25 | End: 2025-08-28 | Stop reason: ALTCHOICE

## 2025-08-28 LAB — BACTERIA UR CULT: ABNORMAL

## 2025-09-03 DIAGNOSIS — F41.1 GAD (GENERALIZED ANXIETY DISORDER): ICD-10-CM

## 2025-09-03 DIAGNOSIS — G47.9 DIFFICULTY SLEEPING: ICD-10-CM

## 2025-09-03 RX ORDER — MIRTAZAPINE 7.5 MG/1
7.5 TABLET, FILM COATED ORAL NIGHTLY
Qty: 90 TABLET | Refills: 0 | Status: SHIPPED | OUTPATIENT
Start: 2025-09-03 | End: 2025-12-02

## 2025-09-05 ENCOUNTER — OFFICE VISIT (OUTPATIENT)
Dept: NEPHROLOGY | Facility: CLINIC | Age: 55
End: 2025-09-05
Payer: COMMERCIAL

## 2025-09-05 VITALS
HEART RATE: 61 BPM | RESPIRATION RATE: 8 BRPM | TEMPERATURE: 98 F | WEIGHT: 106 LBS | BODY MASS INDEX: 20.01 KG/M2 | OXYGEN SATURATION: 98 % | HEIGHT: 61 IN | SYSTOLIC BLOOD PRESSURE: 99 MMHG | DIASTOLIC BLOOD PRESSURE: 66 MMHG

## 2025-09-05 DIAGNOSIS — R79.89 ELEVATED SERUM CREATININE: Primary | ICD-10-CM

## 2025-09-05 DIAGNOSIS — N39.0 RECURRENT UTI: ICD-10-CM

## 2025-09-05 PROCEDURE — 99999 PR PBB SHADOW E&M-EST. PATIENT-LVL III: CPT | Mod: PBBFAC,,, | Performed by: NURSE PRACTITIONER

## (undated) DEVICE — NDL HYPO REG 25G X 1 1/2

## (undated) DEVICE — Device

## (undated) DEVICE — STRIP MEDI WND CLSR 1/4X4IN

## (undated) DEVICE — SPONGE LAP 18X18 PREWASHED

## (undated) DEVICE — NDL SYR 10ML 18X1.5 LL BLUNT

## (undated) DEVICE — SPONGE COTTON TRAY 4X4IN

## (undated) DEVICE — SUT MCRYL PLUS 4-0 PS2 27IN

## (undated) DEVICE — ELECTRODE PATIENT RETURN DISP

## (undated) DEVICE — COVER PROBE WITH BAND 6X96IN

## (undated) DEVICE — PENCIL SMOKE EVAC TELSCP 15FT

## (undated) DEVICE — SUT VICRYL 3-0 27 SH

## (undated) DEVICE — SUPPORT ULNA NERVE PROTECTOR

## (undated) DEVICE — PENCIL SMOKE EVAC ROCKER 70MM

## (undated) DEVICE — SOL IRRI STRL WATER 1000ML

## (undated) DEVICE — CHARM MARGINMAP SPEC 5MM

## (undated) DEVICE — GLOVE SENSICARE PI MICRO 6

## (undated) DEVICE — SUT 2/0 30IN SILK BLK BRAI

## (undated) DEVICE — ELECTRODE BLADE INSULATED 1 IN